# Patient Record
Sex: MALE | Race: WHITE | Employment: FULL TIME | URBAN - METROPOLITAN AREA
[De-identification: names, ages, dates, MRNs, and addresses within clinical notes are randomized per-mention and may not be internally consistent; named-entity substitution may affect disease eponyms.]

---

## 2022-10-21 ENCOUNTER — APPOINTMENT (OUTPATIENT)
Dept: GENERAL RADIOLOGY | Age: 67
DRG: 552 | End: 2022-10-21
Payer: COMMERCIAL

## 2022-10-21 ENCOUNTER — APPOINTMENT (OUTPATIENT)
Dept: CT IMAGING | Age: 67
DRG: 552 | End: 2022-10-21
Payer: COMMERCIAL

## 2022-10-21 ENCOUNTER — HOSPITAL ENCOUNTER (INPATIENT)
Age: 67
LOS: 3 days | Discharge: HOME | DRG: 552 | End: 2022-10-25
Attending: EMERGENCY MEDICINE | Admitting: SURGERY
Payer: COMMERCIAL

## 2022-10-21 DIAGNOSIS — R55 SYNCOPE AND COLLAPSE: ICD-10-CM
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
## 2022-10-21 DIAGNOSIS — I48.91 NEW ONSET A-FIB (HCC): ICD-10-CM

## 2022-10-21 DIAGNOSIS — S12.491A OTHER CLOSED NONDISPLACED FRACTURE OF FIFTH CERVICAL VERTEBRA, INITIAL ENCOUNTER (HCC): ICD-10-CM

## 2022-10-21 LAB
ABSOLUTE EOS #: <0.03 K/UL (ref 0–0.44)
ABSOLUTE IMMATURE GRANULOCYTE: <0.03 K/UL (ref 0–0.3)
ABSOLUTE LYMPH #: 2.45 K/UL (ref 1.1–3.7)
ABSOLUTE MONO #: 0.31 K/UL (ref 0.1–1.2)
ACETAMINOPHEN LEVEL: <5 UG/ML (ref 10–30)
ALBUMIN SERPL-MCNC: 2.9 G/DL (ref 3.5–5.2)
ALBUMIN/GLOBULIN RATIO: 1.4 (ref 1–2.5)
ALP BLD-CCNC: 80 U/L (ref 40–129)
ALT SERPL-CCNC: 26 U/L (ref 5–41)
ANION GAP SERPL CALCULATED.3IONS-SCNC: 12 MMOL/L (ref 9–17)
AST SERPL-CCNC: 35 U/L
BASOPHILS # BLD: 0 % (ref 0–2)
BASOPHILS ABSOLUTE: <0.03 K/UL (ref 0–0.2)
BILIRUB SERPL-MCNC: 0.3 MG/DL (ref 0.3–1.2)
BUN BLDV-MCNC: 10 MG/DL (ref 8–23)
CALCIUM SERPL-MCNC: 7.8 MG/DL (ref 8.6–10.4)
CHLORIDE BLD-SCNC: 104 MMOL/L (ref 98–107)
CO2: 22 MMOL/L (ref 20–31)
CREAT SERPL-MCNC: 0.81 MG/DL (ref 0.7–1.2)
D-DIMER QUANTITATIVE: 1.86 MG/L FEU
EOSINOPHILS RELATIVE PERCENT: 0 % (ref 1–4)
ETHANOL PERCENT: <0.01 %
ETHANOL: <10 MG/DL
GFR SERPL CREATININE-BSD FRML MDRD: >60 ML/MIN/1.73M2
GLUCOSE BLD-MCNC: 120 MG/DL (ref 70–99)
GLUCOSE BLD-MCNC: 140 MG/DL (ref 75–110)
GLUCOSE BLD-MCNC: 43 MG/DL (ref 75–110)
GLUCOSE BLD-MCNC: 61 MG/DL (ref 70–99)
HCT VFR BLD CALC: 33.8 % (ref 40.7–50.3)
HEMOGLOBIN: 11.5 G/DL (ref 13–17)
IMMATURE GRANULOCYTES: 0 %
INR BLD: 1.1
LYMPHOCYTES # BLD: 54 % (ref 24–43)
MCH RBC QN AUTO: 30.2 PG (ref 25.2–33.5)
MCHC RBC AUTO-ENTMCNC: 34 G/DL (ref 28.4–34.8)
MCV RBC AUTO: 88.7 FL (ref 82.6–102.9)
MONOCYTES # BLD: 7 % (ref 3–12)
NRBC AUTOMATED: 0 PER 100 WBC
PDW BLD-RTO: 14.7 % (ref 11.8–14.4)
PLATELET # BLD: 157 K/UL (ref 138–453)
PMV BLD AUTO: 9.4 FL (ref 8.1–13.5)
POTASSIUM SERPL-SCNC: 3.1 MMOL/L (ref 3.7–5.3)
PRO-BNP: 1630 PG/ML
PROTHROMBIN TIME: 11.7 SEC (ref 9.1–12.3)
RBC # BLD: 3.81 M/UL (ref 4.21–5.77)
RBC # BLD: ABNORMAL 10*6/UL
SALICYLATE LEVEL: <1 MG/DL (ref 3–10)
SEG NEUTROPHILS: 39 % (ref 36–65)
SEGMENTED NEUTROPHILS ABSOLUTE COUNT: 1.81 K/UL (ref 1.5–8.1)
SODIUM BLD-SCNC: 138 MMOL/L (ref 135–144)
TOTAL PROTEIN: 5 G/DL (ref 6.4–8.3)
TOXIC TRICYCLIC SC,BLOOD: NEGATIVE
TROPONIN, HIGH SENSITIVITY: 13 NG/L (ref 0–22)
TROPONIN, HIGH SENSITIVITY: 14 NG/L (ref 0–22)
TSH SERPL DL<=0.05 MIU/L-ACNC: 4.73 UIU/ML (ref 0.3–5)
WBC # BLD: 4.6 K/UL (ref 3.5–11.3)

## 2022-10-21 PROCEDURE — 80053 COMPREHEN METABOLIC PANEL: CPT

## 2022-10-21 PROCEDURE — 82947 ASSAY GLUCOSE BLOOD QUANT: CPT

## 2022-10-21 PROCEDURE — 3209999900 CT LUMBAR SPINE TRAUMA RECONSTRUCTION

## 2022-10-21 PROCEDURE — 6360000004 HC RX CONTRAST MEDICATION: Performed by: STUDENT IN AN ORGANIZED HEALTH CARE EDUCATION/TRAINING PROGRAM

## 2022-10-21 PROCEDURE — 96361 HYDRATE IV INFUSION ADD-ON: CPT

## 2022-10-21 PROCEDURE — 99285 EMERGENCY DEPT VISIT HI MDM: CPT

## 2022-10-21 PROCEDURE — 85025 COMPLETE CBC W/AUTO DIFF WBC: CPT

## 2022-10-21 PROCEDURE — 70450 CT HEAD/BRAIN W/O DYE: CPT

## 2022-10-21 PROCEDURE — 80307 DRUG TEST PRSMV CHEM ANLYZR: CPT

## 2022-10-21 PROCEDURE — 93005 ELECTROCARDIOGRAM TRACING: CPT | Performed by: SURGERY

## 2022-10-21 PROCEDURE — 71260 CT THORAX DX C+: CPT

## 2022-10-21 PROCEDURE — 96360 HYDRATION IV INFUSION INIT: CPT

## 2022-10-21 PROCEDURE — 84484 ASSAY OF TROPONIN QUANT: CPT

## 2022-10-21 PROCEDURE — 84443 ASSAY THYROID STIM HORMONE: CPT

## 2022-10-21 PROCEDURE — 85379 FIBRIN DEGRADATION QUANT: CPT

## 2022-10-21 PROCEDURE — 83880 ASSAY OF NATRIURETIC PEPTIDE: CPT

## 2022-10-21 PROCEDURE — 2580000003 HC RX 258

## 2022-10-21 PROCEDURE — 71045 X-RAY EXAM CHEST 1 VIEW: CPT

## 2022-10-21 PROCEDURE — 80179 DRUG ASSAY SALICYLATE: CPT

## 2022-10-21 PROCEDURE — 85610 PROTHROMBIN TIME: CPT

## 2022-10-21 PROCEDURE — G0480 DRUG TEST DEF 1-7 CLASSES: HCPCS

## 2022-10-21 PROCEDURE — 72125 CT NECK SPINE W/O DYE: CPT

## 2022-10-21 PROCEDURE — 80143 DRUG ASSAY ACETAMINOPHEN: CPT

## 2022-10-21 PROCEDURE — 3209999900 CT THORACIC SPINE TRAUMA RECONSTRUCTION

## 2022-10-21 PROCEDURE — 74177 CT ABD & PELVIS W/CONTRAST: CPT

## 2022-10-21 RX ORDER — DEXTROSE MONOHYDRATE 100 MG/ML
INJECTION, SOLUTION INTRAVENOUS
Status: COMPLETED
Start: 2022-10-21 | End: 2022-10-21

## 2022-10-21 RX ORDER — DEXTROSE MONOHYDRATE 100 MG/ML
INJECTION, SOLUTION INTRAVENOUS CONTINUOUS
Status: DISCONTINUED | OUTPATIENT
Start: 2022-10-21 | End: 2022-10-22

## 2022-10-21 RX ADMIN — DEXTROSE MONOHYDRATE: 100 INJECTION, SOLUTION INTRAVENOUS at 20:55

## 2022-10-21 RX ADMIN — IOPAMIDOL 75 ML: 755 INJECTION, SOLUTION INTRAVENOUS at 22:24

## 2022-10-21 ASSESSMENT — ENCOUNTER SYMPTOMS
DIARRHEA: 0
VOMITING: 0
NAUSEA: 0
COLOR CHANGE: 0
CHEST TIGHTNESS: 0
BACK PAIN: 0
SHORTNESS OF BREATH: 0
CONSTIPATION: 0
ABDOMINAL PAIN: 0
TROUBLE SWALLOWING: 0
COUGH: 0

## 2022-10-21 ASSESSMENT — PAIN - FUNCTIONAL ASSESSMENT: PAIN_FUNCTIONAL_ASSESSMENT: NONE - DENIES PAIN

## 2022-10-21 NOTE — ED PROVIDER NOTES
[facility] ED  Emergency Department Encounter  EmergencyMedicine Resident     Pt Name:Gilmer aNsh  MRN: [id number]  [place] 1955  Date of evaluation: 10/21/22  PCP:  Quynh Boyd MD    [address] [place] [address]       Chief Complaint   Patient presents with    Loss of Consciousness     Nose lac bleeding controlled        HISTORY OF PRESENT ILLNESS  (Location/Symptom, Timing/Onset, Context/Setting, Quality, Duration, Modifying Factors, Severity.)      Trevor Dumont is a 78 y.o. male who presents with presents with syncopal event. Patient reportedly hit his head on an unknown object. Patient denies ever having symptoms like this before. States he is having a headache feels dizzy. Patient has a history of CABG and stage III pancreatic cancer on chemotherapy patient also with history of diabetes    PAST MEDICAL / SURGICAL / SOCIAL / FAMILY HISTORY      has no past medical history on file. has no past surgical history on file. Social History     Socioeconomic History    Marital status:      Spouse name: Not on file    Number of children: Not on file    Years of education: Not on file    Highest education level: Not on file   Occupational History    Not on file   Tobacco Use    Smoking status: Not on file    Smokeless tobacco: Not on file   Substance and Sexual Activity    Alcohol use: Not on file    Drug use: Not on file    Sexual activity: Not on file   Other Topics Concern    Not on file   Social History Narrative    Not on file     Social Determinants of Health     Financial Resource Strain: Not on file   Food Insecurity: Not on file   Transportation Needs: Not on file   Physical Activity: Not on file   Stress: Not on file   Social Connections: Not on file   Intimate Partner Violence: Not on file   Housing Stability: Not on file       No family history on file. Allergies:  Patient has no known allergies.     Home Medications:  Prior to Admission medications    Not on File       REVIEW OF SYSTEMS    (2-9 systems for level 4, 10 or more for level 5)      Review of Systems   Constitutional:  Negative for appetite change, fatigue and fever. HENT:  Negative for congestion and trouble swallowing. Respiratory:  Negative for cough, chest tightness and shortness of breath. Cardiovascular:  Negative for chest pain and leg swelling. Gastrointestinal:  Negative for abdominal pain, constipation, diarrhea, nausea and vomiting. Genitourinary:  Negative for dysuria. Musculoskeletal:  Negative for back pain. Skin:  Negative for color change and rash. Neurological:  Positive for dizziness, syncope and light-headedness. Negative for weakness and headaches. PHYSICAL EXAM   (up to 7 for level 4, 8 or more for level 5)      INITIAL VITALS:   BP (!) 164/81   Pulse 56   Temp 97.8 °F (36.6 °C) (Oral)   Resp 17   Ht 6' (1.829 m)   Wt 200 lb (90.7 kg)   SpO2 98%   BMI 27.12 kg/m²     Physical Exam  Constitutional:       General: He is not in acute distress. Appearance: He is ill-appearing. HENT:      Head: Normocephalic and atraumatic. Nose:      Comments: 2cm laceration  Eyes:      General: No scleral icterus. Extraocular Movements: Extraocular movements intact. Pupils: Pupils are equal, round, and reactive to light. Cardiovascular:      Rate and Rhythm: Bradycardia present. Rhythm irregular. Heart sounds: No murmur heard. No friction rub. No gallop. Pulmonary:      Breath sounds: No wheezing, rhonchi or rales. Abdominal:      General: Abdomen is flat. There is no distension. Palpations: Abdomen is soft. Tenderness: There is no abdominal tenderness. There is no guarding or rebound. Musculoskeletal:         General: No swelling, tenderness or signs of injury. Cervical back: Normal range of motion. No tenderness. Right lower leg: No edema. Left lower leg: No edema.    Skin:     Capillary Refill: Capillary refill takes less than 2 seconds. Findings: No rash. Neurological:      General: No focal deficit present. Mental Status: He is oriented to person, place, and time.        DIFFERENTIAL  DIAGNOSIS     PLAN (LABS / IMAGING / EKG):  Orders Placed This Encounter   Procedures    LACERATION REPAIR    CT HEAD WO CONTRAST    CT CERVICAL SPINE WO CONTRAST    XR CHEST PORTABLE    CT CHEST ABDOMEN PELVIS W CONTRAST Additional Contrast? None    CT THORACIC SPINE TRAUMA RECONSTRUCTION    CT LUMBAR SPINE TRAUMA RECONSTRUCTION    CTA NECK W CONTRAST    CBC with Auto Differential    CMP    D-Dimer, Quantitative    Protime-INR    Troponin    Brain Natriuretic Peptide    TSH with Reflex    TOX SCR, BLD, ED    Digoxin Level    Glucose, Random    Inpatient consult to Neurosurgery    Inpatient consult to Trauma Surgery    POC Glucose Fingerstick    POC Glucose Fingerstick    POC Glucose Fingerstick    POC Glucose Fingerstick    EKG 12 Lead    ADMIT TO INPATIENT       MEDICATIONS ORDERED:  Orders Placed This Encounter   Medications    iopamidol (ISOVUE-370) 76 % injection 75 mL    dextrose 10 % infusion     Irma Parks: cabinet override    dextrose 10 % infusion    Tetanus-Diphth-Acell Pertussis (BOOSTRIX) injection 0.5 mL    DISCONTD: potassium chloride 10 mEq/100 mL IVPB (Peripheral Line)    potassium bicarb-citric acid (EFFER-K) effervescent tablet 40 mEq    iopamidol (ISOVUE-370) 76 % injection 75 mL    lidocaine 1 % injection 20 mL       DIAGNOSTIC RESULTS / EMERGENCY DEPARTMENT COURSE / MDM   LAB RESULTS:  Results for orders placed or performed during the hospital encounter of 10/21/22   CBC with Auto Differential   Result Value Ref Range    WBC 4.6 3.5 - 11.3 k/uL    RBC 3.81 (L) 4.21 - 5.77 m/uL    Hemoglobin 11.5 (L) 13.0 - 17.0 g/dL    Hematocrit 33.8 (L) 40.7 - 50.3 %    MCV 88.7 82.6 - 102.9 fL    MCH 30.2 25.2 - 33.5 pg    MCHC 34.0 28.4 - 34.8 g/dL    RDW 14.7 (H) 11.8 - 14.4 %    Platelets 858 513 - 196 k/uL    MPV 9.4 8.1 - 13.5 fL    NRBC Automated 0.0 0.0 per 100 WBC    Seg Neutrophils 39 36 - 65 %    Lymphocytes 54 (H) 24 - 43 %    Monocytes 7 3 - 12 %    Eosinophils % 0 (L) 1 - 4 %    Basophils 0 0 - 2 %    Immature Granulocytes 0 0 %    Segs Absolute 1.81 1.50 - 8.10 k/uL    Absolute Lymph # 2.45 1.10 - 3.70 k/uL    Absolute Mono # 0.31 0.10 - 1.20 k/uL    Absolute Eos # <0.03 0.00 - 0.44 k/uL    Basophils Absolute <0.03 0.00 - 0.20 k/uL    Absolute Immature Granulocyte <0.03 0.00 - 0.30 k/uL    RBC Morphology ANISOCYTOSIS PRESENT    CMP   Result Value Ref Range    Glucose 61 (L) 70 - 99 mg/dL    BUN 10 8 - 23 mg/dL    Creatinine 0.81 0.70 - 1.20 mg/dL    Est, Glom Filt Rate >60 >60 mL/min/1.73m2    Calcium 7.8 (L) 8.6 - 10.4 mg/dL    Sodium 138 135 - 144 mmol/L    Potassium 3.1 (L) 3.7 - 5.3 mmol/L    Chloride 104 98 - 107 mmol/L    CO2 22 20 - 31 mmol/L    Anion Gap 12 9 - 17 mmol/L    Alkaline Phosphatase 80 40 - 129 U/L    ALT 26 5 - 41 U/L    AST 35 <40 U/L    Total Bilirubin 0.3 0.3 - 1.2 mg/dL    Total Protein 5.0 (L) 6.4 - 8.3 g/dL    Albumin 2.9 (L) 3.5 - 5.2 g/dL    Albumin/Globulin Ratio 1.4 1.0 - 2.5   D-Dimer, Quantitative   Result Value Ref Range    D-Dimer, Quant 1.86 mg/L FEU   Protime-INR   Result Value Ref Range    Protime 11.7 9.1 - 12.3 sec    INR 1.1    Troponin   Result Value Ref Range    Troponin, High Sensitivity 13 0 - 22 ng/L   Troponin   Result Value Ref Range    Troponin, High Sensitivity 14 0 - 22 ng/L   Brain Natriuretic Peptide   Result Value Ref Range    Pro-BNP 1,630 (H) <300 pg/mL   TSH with Reflex   Result Value Ref Range    TSH 4.73 0.30 - 5.00 uIU/mL   TOX SCR, BLD, ED   Result Value Ref Range    Acetaminophen Level <5 (L) 10 - 30 ug/mL    Ethanol <10 <10 mg/dL    Ethanol percent <6.329 <6.029 %    Salicylate Lvl <1 (L) 3 - 10 mg/dL    Toxic Tricyclic Sc,Blood NEGATIVE NEGATIVE   Glucose, Random   Result Value Ref Range    Glucose 120 (H) 70 - 99 mg/dL   POC Glucose Fingerstick Result Value Ref Range    POC Glucose 43 (L) 75 - 110 mg/dL   POC Glucose Fingerstick   Result Value Ref Range    POC Glucose 140 (H) 75 - 110 mg/dL   POC Glucose Fingerstick   Result Value Ref Range    POC Glucose 110 75 - 110 mg/dL   POC Glucose Fingerstick   Result Value Ref Range    POC Glucose 146 (H) 75 - 110 mg/dL       RADIOLOGY:  CT HEAD WO CONTRAST    Result Date: 10/21/2022  Head CT: No acute intracranial abnormality detected. Mildly impacted and angulated nasal bone fractures bilaterally. Cervical spine CT: Potentially unstable fracture involving the posterior elements of C5. Findings were discussed with Heladio KABA at 11:12 pm on 10/21/2022. CT CERVICAL SPINE WO CONTRAST    Result Date: 10/21/2022  Head CT: No acute intracranial abnormality detected. Mildly impacted and angulated nasal bone fractures bilaterally. Cervical spine CT: Potentially unstable fracture involving the posterior elements of C5. Findings were discussed with Heladio KABA at 11:12 pm on 10/21/2022. XR CHEST PORTABLE    Result Date: 10/21/2022  Cardial pericardial silhouette enlargement. Differential considerations include cardiomyopathy and pericardial effusion. No acute infiltrate identified. CT CHEST ABDOMEN PELVIS W CONTRAST Additional Contrast? None    Result Date: 10/21/2022  1. No thoracic or abdominal aortic aneurysm or dissection. 2. Atherosclerosis with coronary artery involvement and postoperative changes from prior CABG. 3. Postoperative changes seen related to reported history of pancreatic neoplasm, with a 19 mm left adrenal nodule which could represent adrenal metastasis. Correlate with any previous outside CT imaging as no comparisons are available within PACS for my review. 4. There is a small fluid collection seen adjacent to the pancreatic resection site and hepaticojejunostomy, which could represent a small postoperative seroma or lymphocele.   No solid-appearing metastasis noted elsewhere. 5. No acute process seen within the abdomen or pelvis. 6. Trace amount of ascites seen in the right lower quadrant of uncertain significance. CT LUMBAR SPINE TRAUMA RECONSTRUCTION    Result Date: 10/22/2022  1. No acute lumbar spine or thoracic spine fracture. 2. Multilevel mild degenerative changes are seen. CT THORACIC SPINE TRAUMA RECONSTRUCTION    Result Date: 10/22/2022  1. No acute lumbar spine or thoracic spine fracture. 2. Multilevel mild degenerative changes are seen. EKG Interpretation    Interpreted by myself    Atrial fibrillation with slow ventricular response no ST elevation or depression    Presumed new onset atrial fibrillation    All EKG's are interpreted by the Emergency Department Physician who either signs or Co-signs this chart in the absence of a cardiologist.    EMERGENCY DEPARTMENT COURSE:  ED Course as of 10/22/22 0316   Fri Oct 21, 2022   1937 Patient evaluated bedside. Syncope fell and hit his head. ANO x4. Nasal bone lac. Will get work-up head CT cervical x-ray and EKG [ZE]   2053 hypoglycemic patient took Lantus today. No sulfonylureas. We will start D10 [ZE]   2327 Angulated nasal bone fractures. Unstable C5 fracture.   Placed in collar trauma consulted [ZE]   Sat Oct 22, 2022   0146 Patient admitted to trauma service [ZE]      ED Course User Index  [ZE] Sukhjinder Donovan DO       PROCEDURES:  Lac Repair    Date/Time: 10/22/2022 3:15 AM  Performed by: Sukhjinder Donovan DO  Authorized by: Jesus Tomlinson MD     Consent:     Consent obtained:  Verbal    Consent given by:  Patient    Risks discussed:  Pain and need for additional repair    Alternatives discussed:  Delayed treatment  Anesthesia:     Anesthesia method:  Local infiltration    Local anesthetic:  Lidocaine 1% w/o epi  Laceration details:     Location:  Face    Face location:  Nose    Length (cm):  2    Depth (mm):  5  Pre-procedure details:     Preparation:  Patient was prepped and draped in usual sterile fashion  Exploration:     Wound extent: areolar tissue violated      Contaminated: no    Treatment:     Area cleansed with:  Saline    Amount of cleaning:  Standard    Irrigation solution:  Sterile saline    Irrigation volume:  50cc    Irrigation method:  Syringe    Visualized foreign bodies/material removed: no    Skin repair:     Repair method:  Sutures    Suture size:  5-0    Suture material:  Nylon    Suture technique:  Simple interrupted    Number of sutures:  2  Approximation:     Approximation:  Close  Repair type:     Repair type:  Simple  Post-procedure details:     Dressing:  Antibiotic ointment     CONSULTS:  IP CONSULT TO NEUROSURGERY  IP CONSULT TO TRAUMA SURGERY    CRITICAL CARE:  none    MDM  Presents as a syncopal fall unclear etiology patient does have new onset A. fib with slow ventricular response this could be the reason. Patient was noted to have nasal bone fractures and unstable C5 fracture therefore trauma was consulted and admitted patient. CT chest abdomen pelvis unremarkable labs otherwise unremarkable. Patient did have episode of hypoglycemia requiring D10    FINAL IMPRESSION      1. Fall, initial encounter    2. Syncope and collapse    3. Other closed nondisplaced fracture of fifth cervical vertebra, initial encounter ([facility] [address]    4. New onset a-fib [facility])          DISPOSITION / PLAN     DISPOSITION Admitted 10/22/2022 03:16:19 AM      PATIENT REFERRED TO:  No follow-up provider specified.     DISCHARGE MEDICATIONS:  New Prescriptions    No medications on file       Reginald Moore DO  Emergency Medicine Resident    (Please note that portions of thisnote were completed with a voice recognition program.  Efforts were made to edit the dictations but occasionally words are mis-transcribed.)        Mehul Son, DO  Resident  10/22/22 [id number], DO  Resident  10/22/22 [id number]

## 2022-10-21 NOTE — ED PROVIDER NOTES
[facility] [address] ED     Emergency Department     Faculty Attestation        I performed a history and physical examination of the patient and discussed management with the resident. I reviewed the residents note and agree with the documented findings and plan of care. Any areas of disagreement are noted on the chart. I was personally present for the key portions of any procedures. I have documented in the chart those procedures where I was not present during the key portions. I have reviewed the emergency nurses triage note. I agree with the chief complaint, past medical history, past surgical history, allergies, medications, social and family history as documented unless otherwise noted below. For Physician Assistant/ Nurse Practitioner cases/documentation I have personally evaluated this patient and have completed at least one if not all key elements of the E/M (history, physical exam, and MDM). Additional findings are as noted. Vital Signs: BP: 135/78  Heart Rate: 57  Resp: 22  Temp: (!) 94.8 °F (34.9 °C) (RN unable to obtain temp w 2 different thermometers dr Jhonny Beyer aware) SpO2: 95 %  PCP:  No primary care provider on file. Pertinent Comments:     Patient is a 71-year-old male who has limited knowledge of his past medical history but wife was able to give better history after her arrival.   He apparently has pancreatic cancer with possible metastases. Also history of CABG. Was at the [facility] when suffered what appeared to be a syncopal episode per wife with no obvious seizure activity or tongue biting. Did hit his forehead/nose with laceration. Patient is now back to almost his baseline with only very mild confusion. No C-spine pain but will obtain imaging. Moving all extremities with 5/5 strength and sensation light touch intact.    Pupils are readily reactive to light bilaterally and equal.    On exam and on the monitor appears to have right bundle branch block with atrial fibrillation with slow ventricular response. No hypotension at this time. Assessment/plan: Patient with severe medical problems as well as syncopal episode of unknown etiology with apparently no history of atrial fibrillation. Placing on [place] in case needed with occasional heart rate in the 30s but blood pressure normal.    Awaiting CT head as well as C-spine and CT chest/abdomen/pelvis. Obviously cardiac work-up and toxicologic work-up as well. Admission after    EKG Interpretation    Interpreted by emergency department physician    Rhythm: Irregularly irregular  Rate: Atrial fibrillation with slow ventricular response at 52 bpm  Axis: normal  Conduction: Right bundle branch block with atrial fibrillation  ST Segments: no acute change  T Waves: no acute change  Q Waves: no acute change    Clinical Impression: Atrial Fibrillation with slow ventricular response and right bundle branch block      CRITICAL CARE: There was a high probability of clinically significant/life threatening deterioration in this patient's condition which required my urgent intervention. Total critical care time was 20 minutes. This excludes any time for separately reportable procedures. (Please note that portions of this note were completed with a voice recognition program. Efforts were made to edit the dictations but occasionally words are mis-transcribed.  Whenever words are used in this note in any gender, they shall be construed as though they were used in the gender appropriate to the circumstances; and whenever words are used in this note in the singular or plural form, they shall be construed as though they were used in the form appropriate to the circumstances.)    MD Yomaira Mckeon Mt  Attending Emergency Medicine Physician           Fran Rivas MD  10/21/22 1 Jose Haro MD  10/21/22 [id number]

## 2022-10-22 ENCOUNTER — APPOINTMENT (OUTPATIENT)
Dept: CT IMAGING | Age: 67
DRG: 552 | End: 2022-10-22
Payer: COMMERCIAL

## 2022-10-22 ENCOUNTER — APPOINTMENT (OUTPATIENT)
Dept: MRI IMAGING | Age: 67
DRG: 552 | End: 2022-10-22
Payer: COMMERCIAL

## 2022-10-22 PROBLEM — S12.9XXA: Status: ACTIVE | Noted: 2022-10-22
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
## 2022-10-22 LAB
ABSOLUTE EOS #: <0.03 K/UL (ref 0–0.44)
ABSOLUTE IMMATURE GRANULOCYTE: <0.03 K/UL (ref 0–0.3)
ABSOLUTE LYMPH #: 1.2 K/UL (ref 1.1–3.7)
ABSOLUTE MONO #: 0.26 K/UL (ref 0.1–1.2)
ANION GAP SERPL CALCULATED.3IONS-SCNC: 9 MMOL/L (ref 9–17)
BASOPHILS # BLD: 0 % (ref 0–2)
BASOPHILS ABSOLUTE: <0.03 K/UL (ref 0–0.2)
BUN BLDV-MCNC: 8 MG/DL (ref 8–23)
CALCIUM SERPL-MCNC: 7.7 MG/DL (ref 8.6–10.4)
CHLORIDE BLD-SCNC: 102 MMOL/L (ref 98–107)
CO2: 23 MMOL/L (ref 20–31)
CREAT SERPL-MCNC: 0.65 MG/DL (ref 0.7–1.2)
DIGOXIN LEVEL: <0.3 NG/ML (ref 0.5–2)
EOSINOPHILS RELATIVE PERCENT: 0 % (ref 1–4)
GFR SERPL CREATININE-BSD FRML MDRD: >60 ML/MIN/1.73M2
GLUCOSE BLD-MCNC: 110 MG/DL (ref 75–110)
GLUCOSE BLD-MCNC: 127 MG/DL (ref 75–110)
GLUCOSE BLD-MCNC: 139 MG/DL (ref 75–110)
GLUCOSE BLD-MCNC: 141 MG/DL (ref 70–99)
GLUCOSE BLD-MCNC: 146 MG/DL (ref 75–110)
GLUCOSE BLD-MCNC: 189 MG/DL (ref 75–110)
GLUCOSE BLD-MCNC: 209 MG/DL (ref 75–110)
GLUCOSE BLD-MCNC: 271 MG/DL (ref 75–110)
HCT VFR BLD CALC: 32.6 % (ref 40.7–50.3)
HEMOGLOBIN: 11.2 G/DL (ref 13–17)
IMMATURE GRANULOCYTES: 0 %
LYMPHOCYTES # BLD: 29 % (ref 24–43)
MAGNESIUM: 1.7 MG/DL (ref 1.6–2.6)
MCH RBC QN AUTO: 30.4 PG (ref 25.2–33.5)
MCHC RBC AUTO-ENTMCNC: 34.4 G/DL (ref 28.4–34.8)
MCV RBC AUTO: 88.6 FL (ref 82.6–102.9)
MONOCYTES # BLD: 6 % (ref 3–12)
MRSA, DNA, NASAL: NEGATIVE
NRBC AUTOMATED: 0 PER 100 WBC
PDW BLD-RTO: 14.6 % (ref 11.8–14.4)
PHOSPHORUS: 3.5 MG/DL (ref 2.5–4.5)
PLATELET # BLD: 111 K/UL (ref 138–453)
PMV BLD AUTO: 9.1 FL (ref 8.1–13.5)
POTASSIUM SERPL-SCNC: 3.8 MMOL/L (ref 3.7–5.3)
RBC # BLD: 3.68 M/UL (ref 4.21–5.77)
RBC # BLD: ABNORMAL 10*6/UL
SEG NEUTROPHILS: 65 % (ref 36–65)
SEGMENTED NEUTROPHILS ABSOLUTE COUNT: 2.68 K/UL (ref 1.5–8.1)
SODIUM BLD-SCNC: 134 MMOL/L (ref 135–144)
SPECIMEN DESCRIPTION: NORMAL
WBC # BLD: 4.2 K/UL (ref 3.5–11.3)

## 2022-10-22 PROCEDURE — 6370000000 HC RX 637 (ALT 250 FOR IP): Performed by: STUDENT IN AN ORGANIZED HEALTH CARE EDUCATION/TRAINING PROGRAM

## 2022-10-22 PROCEDURE — 70498 CT ANGIOGRAPHY NECK: CPT

## 2022-10-22 PROCEDURE — 82947 ASSAY GLUCOSE BLOOD QUANT: CPT

## 2022-10-22 PROCEDURE — 2000000000 HC ICU R&B

## 2022-10-22 PROCEDURE — 99252 IP/OBS CONSLTJ NEW/EST SF 35: CPT | Performed by: NEUROLOGICAL SURGERY

## 2022-10-22 PROCEDURE — 2580000003 HC RX 258: Performed by: STUDENT IN AN ORGANIZED HEALTH CARE EDUCATION/TRAINING PROGRAM

## 2022-10-22 PROCEDURE — 6370000000 HC RX 637 (ALT 250 FOR IP): Performed by: INTERNAL MEDICINE

## 2022-10-22 PROCEDURE — 92523 SPEECH SOUND LANG COMPREHEN: CPT

## 2022-10-22 PROCEDURE — 80162 ASSAY OF DIGOXIN TOTAL: CPT

## 2022-10-22 PROCEDURE — 84100 ASSAY OF PHOSPHORUS: CPT

## 2022-10-22 PROCEDURE — 90471 IMMUNIZATION ADMIN: CPT | Performed by: STUDENT IN AN ORGANIZED HEALTH CARE EDUCATION/TRAINING PROGRAM

## 2022-10-22 PROCEDURE — 6360000002 HC RX W HCPCS: Performed by: STUDENT IN AN ORGANIZED HEALTH CARE EDUCATION/TRAINING PROGRAM

## 2022-10-22 PROCEDURE — 80048 BASIC METABOLIC PNL TOTAL CA: CPT

## 2022-10-22 PROCEDURE — 87641 MR-STAPH DNA AMP PROBE: CPT

## 2022-10-22 PROCEDURE — 85025 COMPLETE CBC W/AUTO DIFF WBC: CPT

## 2022-10-22 PROCEDURE — 83735 ASSAY OF MAGNESIUM: CPT

## 2022-10-22 PROCEDURE — 94761 N-INVAS EAR/PLS OXIMETRY MLT: CPT

## 2022-10-22 PROCEDURE — 36415 COLL VENOUS BLD VENIPUNCTURE: CPT

## 2022-10-22 PROCEDURE — 6360000004 HC RX CONTRAST MEDICATION: Performed by: STUDENT IN AN ORGANIZED HEALTH CARE EDUCATION/TRAINING PROGRAM

## 2022-10-22 PROCEDURE — 72141 MRI NECK SPINE W/O DYE: CPT

## 2022-10-22 PROCEDURE — 93005 ELECTROCARDIOGRAM TRACING: CPT | Performed by: STUDENT IN AN ORGANIZED HEALTH CARE EDUCATION/TRAINING PROGRAM

## 2022-10-22 PROCEDURE — 90715 TDAP VACCINE 7 YRS/> IM: CPT | Performed by: STUDENT IN AN ORGANIZED HEALTH CARE EDUCATION/TRAINING PROGRAM

## 2022-10-22 RX ORDER — SODIUM CHLORIDE 9 MG/ML
INJECTION, SOLUTION INTRAVENOUS PRN
Status: DISCONTINUED | OUTPATIENT
Start: 2022-10-22 | End: 2022-10-25

## 2022-10-22 RX ORDER — FENTANYL CITRATE 50 UG/ML
50 INJECTION, SOLUTION INTRAMUSCULAR; INTRAVENOUS ONCE
Status: COMPLETED | OUTPATIENT
Start: 2022-10-22 | End: 2022-10-22

## 2022-10-22 RX ORDER — ACETAMINOPHEN 500 MG
1000 TABLET ORAL EVERY 8 HOURS SCHEDULED
Status: DISCONTINUED | OUTPATIENT
Start: 2022-10-22 | End: 2022-10-23

## 2022-10-22 RX ORDER — SODIUM CHLORIDE 0.9 % (FLUSH) 0.9 %
5-40 SYRINGE (ML) INJECTION PRN
Status: DISCONTINUED | OUTPATIENT
Start: 2022-10-22 | End: 2022-10-25 | Stop reason: HOSPADM

## 2022-10-22 RX ORDER — INSULIN GLARGINE 100 [IU]/ML
18 INJECTION, SOLUTION SUBCUTANEOUS EVERY MORNING
COMMUNITY

## 2022-10-22 RX ORDER — DEXAMETHASONE 4 MG/1
4 TABLET ORAL DAILY
Status: DISCONTINUED | OUTPATIENT
Start: 2022-10-22 | End: 2022-10-22

## 2022-10-22 RX ORDER — SODIUM CHLORIDE 0.9 % (FLUSH) 0.9 %
5-40 SYRINGE (ML) INJECTION EVERY 12 HOURS SCHEDULED
Status: DISCONTINUED | OUTPATIENT
Start: 2022-10-22 | End: 2022-10-25 | Stop reason: HOSPADM

## 2022-10-22 RX ORDER — INSULIN GLARGINE 100 [IU]/ML
18 INJECTION, SOLUTION SUBCUTANEOUS EVERY MORNING
Status: DISCONTINUED | OUTPATIENT
Start: 2022-10-22 | End: 2022-10-25

## 2022-10-22 RX ORDER — POLYETHYLENE GLYCOL 3350 17 G/17G
17 POWDER, FOR SOLUTION ORAL DAILY
Status: DISCONTINUED | OUTPATIENT
Start: 2022-10-22 | End: 2022-10-25 | Stop reason: HOSPADM

## 2022-10-22 RX ORDER — DEXTROSE MONOHYDRATE 100 MG/ML
INJECTION, SOLUTION INTRAVENOUS CONTINUOUS PRN
Status: DISCONTINUED | OUTPATIENT
Start: 2022-10-22 | End: 2022-10-25

## 2022-10-22 RX ORDER — AMLODIPINE BESYLATE 5 MG/1
5 TABLET ORAL DAILY
Status: DISCONTINUED | OUTPATIENT
Start: 2022-10-22 | End: 2022-10-23

## 2022-10-22 RX ORDER — OXYCODONE HYDROCHLORIDE 5 MG/1
5 TABLET ORAL EVERY 6 HOURS PRN
Status: DISCONTINUED | OUTPATIENT
Start: 2022-10-22 | End: 2022-10-25

## 2022-10-22 RX ORDER — AMLODIPINE BESYLATE 2.5 MG/1
2.5 TABLET ORAL DAILY
COMMUNITY

## 2022-10-22 RX ORDER — SODIUM CHLORIDE, SODIUM LACTATE, POTASSIUM CHLORIDE, CALCIUM CHLORIDE 600; 310; 30; 20 MG/100ML; MG/100ML; MG/100ML; MG/100ML
INJECTION, SOLUTION INTRAVENOUS CONTINUOUS
Status: DISCONTINUED | OUTPATIENT
Start: 2022-10-22 | End: 2022-10-23

## 2022-10-22 RX ORDER — POTASSIUM CHLORIDE 7.45 MG/ML
10 INJECTION INTRAVENOUS
Status: DISCONTINUED | OUTPATIENT
Start: 2022-10-22 | End: 2022-10-22

## 2022-10-22 RX ORDER — ATORVASTATIN CALCIUM 80 MG/1
80 TABLET, FILM COATED ORAL DAILY
COMMUNITY

## 2022-10-22 RX ORDER — CANDESARTAN 32 MG/1
32 TABLET ORAL DAILY
COMMUNITY

## 2022-10-22 RX ORDER — ONDANSETRON 2 MG/ML
4 INJECTION INTRAMUSCULAR; INTRAVENOUS EVERY 6 HOURS PRN
Status: DISCONTINUED | OUTPATIENT
Start: 2022-10-22 | End: 2022-10-25

## 2022-10-22 RX ORDER — HYDRALAZINE HYDROCHLORIDE 20 MG/ML
10 INJECTION INTRAMUSCULAR; INTRAVENOUS EVERY 6 HOURS PRN
Status: DISCONTINUED | OUTPATIENT
Start: 2022-10-22 | End: 2022-10-25

## 2022-10-22 RX ORDER — CARVEDILOL 25 MG/1
25 TABLET ORAL 2 TIMES DAILY
COMMUNITY

## 2022-10-22 RX ORDER — CARVEDILOL 25 MG/1
25 TABLET ORAL 2 TIMES DAILY
Status: DISCONTINUED | OUTPATIENT
Start: 2022-10-22 | End: 2022-10-25 | Stop reason: HOSPADM

## 2022-10-22 RX ORDER — LIDOCAINE HYDROCHLORIDE 10 MG/ML
20 INJECTION, SOLUTION INFILTRATION; PERINEURAL ONCE
Status: DISCONTINUED | OUTPATIENT
Start: 2022-10-22 | End: 2022-10-25 | Stop reason: HOSPADM

## 2022-10-22 RX ORDER — ATORVASTATIN CALCIUM 80 MG/1
80 TABLET, FILM COATED ORAL DAILY
Status: DISCONTINUED | OUTPATIENT
Start: 2022-10-22 | End: 2022-10-25 | Stop reason: HOSPADM

## 2022-10-22 RX ORDER — GINSENG 100 MG
CAPSULE ORAL PRN
Status: DISCONTINUED | OUTPATIENT
Start: 2022-10-22 | End: 2022-10-25

## 2022-10-22 RX ORDER — DEXAMETHASONE 2 MG/1
4 TABLET ORAL SEE ADMIN INSTRUCTIONS
COMMUNITY

## 2022-10-22 RX ORDER — DEXAMETHASONE 4 MG/1
4 TABLET ORAL DAILY
Status: COMPLETED | OUTPATIENT
Start: 2022-10-22 | End: 2022-10-23

## 2022-10-22 RX ORDER — AMLODIPINE BESYLATE 2.5 MG/1
2.5 TABLET ORAL DAILY
Status: DISCONTINUED | OUTPATIENT
Start: 2022-10-22 | End: 2022-10-22

## 2022-10-22 RX ADMIN — TETANUS TOXOID, REDUCED DIPHTHERIA TOXOID AND ACELLULAR PERTUSSIS VACCINE, ADSORBED 0.5 ML: 5; 2.5; 8; 8; 2.5 SUSPENSION INTRAMUSCULAR at 00:13

## 2022-10-22 RX ADMIN — ATORVASTATIN CALCIUM 80 MG: 80 TABLET, FILM COATED ORAL at 15:15

## 2022-10-22 RX ADMIN — INSULIN GLARGINE 18 UNITS: 100 INJECTION, SOLUTION SUBCUTANEOUS at 12:57

## 2022-10-22 RX ADMIN — SODIUM CHLORIDE, POTASSIUM CHLORIDE, SODIUM LACTATE AND CALCIUM CHLORIDE: 600; 310; 30; 20 INJECTION, SOLUTION INTRAVENOUS at 16:46

## 2022-10-22 RX ADMIN — FENTANYL CITRATE 50 MCG: 50 INJECTION, SOLUTION INTRAMUSCULAR; INTRAVENOUS at 03:32

## 2022-10-22 RX ADMIN — IOPAMIDOL 75 ML: 755 INJECTION, SOLUTION INTRAVENOUS at 02:27

## 2022-10-22 RX ADMIN — DEXAMETHASONE 4 MG: 4 TABLET ORAL at 15:15

## 2022-10-22 RX ADMIN — SODIUM CHLORIDE, PRESERVATIVE FREE 10 ML: 5 INJECTION INTRAVENOUS at 21:09

## 2022-10-22 RX ADMIN — AMLODIPINE BESYLATE 5 MG: 5 TABLET ORAL at 15:15

## 2022-10-22 RX ADMIN — SODIUM CHLORIDE, PRESERVATIVE FREE 10 ML: 5 INJECTION INTRAVENOUS at 07:49

## 2022-10-22 RX ADMIN — ACETAMINOPHEN 1000 MG: 500 TABLET ORAL at 21:08

## 2022-10-22 RX ADMIN — ACETAMINOPHEN 1000 MG: 500 TABLET ORAL at 12:48

## 2022-10-22 RX ADMIN — SODIUM CHLORIDE, POTASSIUM CHLORIDE, SODIUM LACTATE AND CALCIUM CHLORIDE: 600; 310; 30; 20 INJECTION, SOLUTION INTRAVENOUS at 04:42

## 2022-10-22 RX ADMIN — BACITRACIN: 500 OINTMENT TOPICAL at 16:21

## 2022-10-22 RX ADMIN — POTASSIUM BICARBONATE 40 MEQ: 782 TABLET, EFFERVESCENT ORAL at 03:33

## 2022-10-22 RX ADMIN — ACETAMINOPHEN 1000 MG: 500 TABLET ORAL at 06:45

## 2022-10-22 ASSESSMENT — PAIN DESCRIPTION - LOCATION
LOCATION: NECK

## 2022-10-22 ASSESSMENT — PAIN SCALES - GENERAL
PAINLEVEL_OUTOF10: 0
PAINLEVEL_OUTOF10: 0
PAINLEVEL_OUTOF10: 2

## 2022-10-22 ASSESSMENT — LIFESTYLE VARIABLES
HOW OFTEN DO YOU HAVE A DRINK CONTAINING ALCOHOL: MONTHLY OR LESS
HOW MANY STANDARD DRINKS CONTAINING ALCOHOL DO YOU HAVE ON A TYPICAL DAY: 1 OR 2

## 2022-10-22 NOTE — CONSULTS
[facility]    Consult / H&P               Today's Date: 10/22/2022  Patient Name: Fauzia Jones  Date of admission: 10/21/2022  7:31 PM  Patient's age: 78 y.o., 1955  Admission Dx: Syncope and collapse [R55]  New onset a-fib [facility]) [I48.91]  Fall, initial encounter [W19. XXXA]  Closed fracture of spinous process of cervical vertebra, initial encounter ([facility] [address] [S12. 9XXA]  Other closed nondisplaced fracture of fifth cervical vertebra, initial encounter ([facility] [address] [S88.545F]    Reason for Consult:  Cardiac evaluation    Requesting Physician: Sarah Groves MD    REASON FOR CONSULT:  Bradycardia    History Obtained From:  patient, electronic medical record    HISTORY OF PRESENT ILLNESS:      The patient is a 78 y.o. gentleman initially admitted with C5 spine fracture and bilateral nasal bone fracture initial admit to trauma ICU. Patient initially presented with fall from standing height, stated that he was at a casino table when he felt dizzy and lightheaded and fell forward, he states that he fell on the front of his face but denies any loss of consciousness. Cardiology was consulted for bradycardia and syncope. Patient reportedly does have a history of A. fib however he has not been on AC likely secondary to multiple falls however no records available in EMR. Reportedly when he was brought to the ER he was also found to be hypoglycemic with blood glucose in the 40s. Patient does have a history of coronary artery bypass surgery in 2005 but no records available. Past Medical History:   has no past medical history on file. Past Surgical History:   has no past surgical history on file.      Home Medications:    Prior to Admission medications    Not on File      Current Facility-Administered Medications: sodium chloride flush 0.9 % injection 5-40 mL, 5-40 mL, IntraVENous, 2 times per day  sodium chloride flush 0.9 % injection 5-40 mL, 5-40 mL, IntraVENous, PRN  0.9 % sodium chloride infusion, , IntraVENous, PRN  polyethylene glycol (GLYCOLAX) packet 17 g, 17 g, Oral, Daily  lactated ringers infusion, , IntraVENous, Continuous  ondansetron (ZOFRAN) injection 4 mg, 4 mg, IntraVENous, Q6H PRN  acetaminophen (TYLENOL) tablet 1,000 mg, 1,000 mg, Oral, 3 times per day  oxyCODONE (ROXICODONE) immediate release tablet 5 mg, 5 mg, Oral, Q6H PRN  lidocaine 1 % injection 20 mL, 20 mL, IntraDERmal, Once    Allergies:  Patient has no known allergies. Social History:        Family History: family history is not on file. REVIEW OF SYSTEMS:    Constitutional: there has been no unanticipated weight loss. There's been No change in energy level, No change in activity level. Eyes: No visual changes or diplopia. No scleral icterus. ENT: No Headaches  Cardiovascular: As above. Respiratory: No previous pulmonary problems, No cough  Gastrointestinal: No abdominal pain. No change in bowel or bladder habits. Genitourinary: No dysuria, trouble voiding, or hematuria. Musculoskeletal:  No gait disturbance, No weakness or joint complaints. Integumentary: No rash or pruritis. Neurological: No headache, diplopia, change in muscle strength, numbness or tingling. No change in gait, balance, coordination, mood, affect, memory, mentation, behavior. Psychiatric: No anxiety, or depression. Endocrine: No temperature intolerance. No excessive thirst, fluid intake, or urination. No tremor. Hematologic/Lymphatic: No abnormal bruising or bleeding, blood clots or swollen lymph nodes. Allergic/Immunologic: No nasal congestion or hives. PHYSICAL EXAM:      BP (!) 174/85   Pulse 56   Temp 97.9 °F (36.6 °C) (Oral)   Resp 14   Ht 6' (1.829 m)   Wt 200 lb 2.8 oz (90.8 kg)   SpO2 97%   BMI 27.15 kg/m²    Constitutional and General Appearance: alert, cooperative, no distress and appears stated age  HEENT: PERRL, no cervical lymphadenopathy. No masses palpable.  Normal oral mucosa  Respiratory:  Normal excursion and expansion without use of accessory muscles  Resp Auscultation: Good respiratory effort. No for increased work of breathing. On auscultation: clear to auscultation bilaterally  Cardiovascular: The apical impulse is not displaced  Heart tones are crisp and normal. regular S1 and S2.  Jugular venous pulsation Normal  The carotid upstroke is normal in amplitude and contour without delay or bruit  Peripheral pulses are symmetrical and full   Abdomen:   No masses or tenderness  Bowel sounds present  Extremities:   No Cyanosis or Clubbing   Lower extremity edema: No   Skin: Warm and dry  Neurological:  Alert and oriented. Moves all extremities well  No abnormalities of mood, affect, memory, mentation, or behavior are noted        Labs:     CBC:   Recent Labs     10/21/22  1944 10/22/22  0524   WBC 4.6 4.2   HGB 11.5* 11.2*   HCT 33.8* 32.6*    111*     BMP:   Recent Labs     10/21/22  1944 10/21/22  2207 10/22/22  0524     --  134*   K 3.1*  --  3.8   CO2 22  --  23   BUN 10  --  8   CREATININE 0.81  --  0.65*   LABGLOM >60  --  >60   GLUCOSE 61* 120* 141*     BNP: No results for input(s): BNP in the last 72 hours. PT/INR:   Recent Labs     10/21/22  1944   PROTIME 11.7   INR 1.1     APTT:No results for input(s): APTT in the last 72 hours. CARDIAC ENZYMES:No results for input(s): CKTOTAL, CKMB, CKMBINDEX, TROPONINI in the last 72 hours. FASTING LIPID PANEL:No results found for: HDL, LDLDIRECT, LDLCALC, TRIG  LIVER PROFILE:  Recent Labs     10/21/22  1944   AST 35   ALT 26   LABALBU 2.9*     DATA:    Diagnostics:    EKG:   ECHO: Ordered    IMPRESSION:    Status post fall with dizziness and LOC- doesn't recall it. Patient was hypoglycemic on arrival with a blood glucose of 43. History of A. fib not on any AC reportedly secondary to multiple falls. Reported bradycardia. History of CABG in 2005 as per patient. No records available.       RECOMMENDATIONS:  Await echocardiogram rule out structural heart abnormality. Patient has been asymptomatic and vitally stable with bradycardia in the low 50s. Hold Coreg and will continue to monitor on telemetry. Increase Norvasc. Consulted EP. Will follow. Discussed with patient and Nurse. José Lopez MD       Cardiovascular Fellow  10/22/2022, 7:49 AM     Attending Cardiologist Addendum: I have reviewed and performed the history, physical, subjective, objective, assessment, and plan with the student/resident/fellow/APN and agree with the note. I performed the history and physical personally. I have made changes to the note above as needed. ECG: Atrial fibrillation with slow ventricular response  Right bundle branch block    ? Symptomatic bradycardia  Hold BB  Check 2d echo  Consult EP for possible need for PPM    Thank you for allowing me to participate in the care of this patient, please do not hesitate to call if you have any questions. Yudelka Sauceda DO, [facility] [place], [address], [address], [place] [address] [facility]  [facility]. [employer]  [phone]

## 2022-10-22 NOTE — ED NOTES
Pt came to ED via [facility] d/t syncopal episode and head lac. EMS states pt was at casino when he fell and hit head. EMS states pts wife saw seizure, wife denies seeing seizure to RN. Pt has hx of open heart at 12years old, and stage 3 pancreatic cancer currently going thru chemotherapy. Pt seems amnesic to some events. - blood thinners, - chest pain and sob. Pt AOx2.  Pt currently connected to monitor w wife at bedside      Orestes Lopez RN  10/21/22 2007

## 2022-10-22 NOTE — CASE COMMUNICATION
SW met with patient to complete SBIRT. Patient states he does not drink alcohol or use drugs. Patient states he has not drank in a long time. When he does, it is only 1-2 drinks. Denies current depression. States cancer diagnosis has led to sadness periodically but is able to manage symptoms. Alcohol Screening and Brief Intervention        Recent Labs     10/21/22  1944   ALC <10       Alcohol Pre-screening  (MEN ONLY) How many times in the past year have you had 5 or more drinks in a day?: None       Alcohol Screening Audit       Drug Pre-Screening   How many times in the past year have you used a recreational drug or used a prescription medication for nonmedical reasons?: None    Drug Screening DAST       Mood Pre-Screening (PHQ-2)  During the past two weeks, have you been bothered by little interest or pleasure in doing things?: No  During the past two weeks, have you been bothered by feeling down, depressed, or hopeless?: No    Mood Pre-Screening (PHQ-9)         I have interviewed Gregorio Montoya, [id number] regarding  His alcohol consumption/drug use and risk for excessive use. Screenings were negative. Patient  N/A intervention at this time.      Deferred []    Completed on: 10/22/2022   BRUNILDA Fitzpatrick

## 2022-10-22 NOTE — PROGRESS NOTES
[address] [facility] [address]     Emergency/Trauma Note    PATIENT NAME: Meche Westbrook    Shift date: 10/21/2022  Shift day: Friday   Shift # 3    Room # 3210/4508-58   Name: Meche Westbrook            Age: 78 y.o. Gender: male          [employer]: Jaimie G[address] of [employer]: Unknown    Trauma/Incident type: Adult Trauma Consult  Admit Date & Time: 10/21/2022  7:31 PM  TRAUMA NAME: N/A    ADVANCE DIRECTIVES IN CHART? No    NAME OF DECISION MAKER: Per next of kin hierarchy, patient's spouse, Billye Klinefelter ([phone]), is patient's primary decision maker. RELATIONSHIP OF DECISION MAKER TO PATIENT: Patient's Spouse    PATIENT/EVENT DESCRIPTION:  Meche Westbrook is a 78 y.o. male who arrived to ED25 and was paged out as an \"Adult Trauma Consult\" due to a \"Fall. \" Per report, patient sustained a \"broken neck and nose. \" Patient was resting in hospital bed, with spouse present at bedside, when  visited. Pt to be admitted to 3006/3006-01. SPIRITUAL ASSESSMENT-INTERVENTION-OUTCOME:   responded to page and gathered patient information outside room.  introduced herself to patient and spouse. Per patient, he is a \"[facility]o\" patient and has undergone treatment over the last seven weeks. Patient complained of being cold and uncomfortable in hospital bed.  provided support, care and an active listening presence with patient and spouse.  provided prayer at bedside for patient's healing, treatment and for strength. Patient and family thanked  for visit and care. PATIENT BELONGINGS:  N/A    ANY BELONGINGS OF SIGNIFICANT VALUE NOTED:  N/A    REGISTRATION STAFF NOTIFIED? Yes      WHAT IS YOUR SPIRITUAL CARE PLAN FOR THIS PATIENT?:  Chaplains can make follow-up visit, per request. Sandor Andrews can be reached 24/7 via [employer].      10/22/22 1607   Encounter Summary   Service Provided For: Patient and family together   Referral/Consult From: Multi-disciplinary team  (Adult Trauma Consult)   Support System Spouse; Family members   Last Encounter  10/21/22   Complexity of Encounter Moderate   Begin Time 0301   End Time  0349   Total Time Calculated 48 min   Encounter    Type Initial Screen/Assessment   Crisis   Type Trauma   Spiritual/Emotional needs   Type Spiritual Support   Assessment/Intervention/Outcome   Assessment Anxious; Fearful;Calm; Hopeful;Tearful   Intervention Active listening;Discussed belief system/Mosque practices/brandie;Discussed illness injury and its impact; Discussed meaning/purpose;Discussed relationship with God;Explored/Affirmed feelings, thoughts, concerns;Explored Coping Skills/Resources;Nurtured Hope;Prayer (assurance of)/[place];Sustaining Presence/Ministry of presence   Outcome Comfort;Coping;Receptive   Plan and Referrals   Plan/Referrals Continue to visit, (comment)  (as needed)     Electronically signed by Guadalupe Fowler on 10/22/2022 at 7:10 AM.  Glynn Proctor  [phone]

## 2022-10-22 NOTE — PROGRESS NOTES
Trauma Tertiary Survey    Admit Date: 10/21/2022  Hospital day 1    [facility]     No past medical history on file. Scheduled Meds:   sodium chloride flush  5-40 mL IntraVENous 2 times per day    polyethylene glycol  17 g Oral Daily    acetaminophen  1,000 mg Oral 3 times per day    lidocaine  20 mL IntraDERmal Once    atorvastatin  80 mg Oral Daily    [Held by provider] carvedilol  25 mg Oral BID    insulin glargine  18 Units SubCUTAneous QAM    amLODIPine  5 mg Oral Daily    dexamethasone  4 mg Oral Daily     Continuous Infusions:   sodium chloride      lactated ringers 100 mL/hr at 10/22/22 1646    dextrose       PRN Meds:sodium chloride flush, sodium chloride, ondansetron, oxyCODONE, hydrALAZINE, glucose, dextrose bolus **OR** dextrose bolus, glucagon (rDNA), dextrose, bacitracin    Subjective:     Patient has complaints of mild cervical pain. .  Pain is mild. C-collar in place. No numbness or weakness down extremities. No other complaints    Objective:   Patient Vitals for the past 8 hrs:   BP Temp Temp src Pulse Resp SpO2   10/22/22 1615 (!) 154/90 97.7 °F (36.5 °C) Oral 67 18 --   10/22/22 1600 137/81 -- -- 61 18 (!) 88 %   10/22/22 1530 (!) 156/87 -- -- 58 14 95 %   10/22/22 1500 (!) 159/91 -- -- 72 21 97 %   10/22/22 1330 (!) 150/84 -- -- 65 17 97 %   10/22/22 1317 -- -- -- 65 21 98 %   10/22/22 1300 (!) 155/84 -- -- 67 21 100 %   10/22/22 1230 (!) 150/101 -- -- 63 (!) 35 97 %   10/22/22 1215 -- 98.6 °F (37 °C) Oral 59 22 97 %   10/22/22 1200 (!) 171/96 -- -- 71 22 94 %   10/22/22 1130 (!) 155/88 -- -- 59 29 95 %   10/22/22 1100 (!) 163/89 -- -- 63 22 99 %   10/22/22 1030 (!) 157/91 -- -- 61 24 99 %   10/22/22 1000 (!) 170/89 -- -- 62 15 98 %   10/22/22 0959 -- 98.4 °F (36.9 °C) Oral -- -- --   10/22/22 0935 -- -- -- 60 -- 98 %   10/22/22 0930 (!) 160/111 -- -- 64 17 98 %       I/O last 3 completed shifts:   In: 216.1 [I.V.:216.1]  Out: 500 [Urine:500]  I/O this shift:  In: 608.3 [I.V.:608.3]  Out: 1905 [SNERJ:2317]    Radiology:  MRI CERVICAL SPINE WO CONTRAST   Final Result   Previously described C5 posterior element fracture is not as well delineated   on the current exam.      Prevertebral soft tissue swelling/fluid from C2-C5. Multilevel degenerative change with bilateral foraminal narrowing as   described above. CTA NECK W CONTRAST   Preliminary Result   No evidence of dissection or acute injury in bilateral common carotid   arteries, bilateral internal carotid arteries and bilateral vertebral   arteries in the neck. In the soft tissues of the neck there is no acute process. Fracture involving the spinous process of C5 vertebra, redemonstrated. CT THORACIC SPINE TRAUMA RECONSTRUCTION   Final Result   1. No acute lumbar spine or thoracic spine fracture. 2. Multilevel mild degenerative changes are seen. CT LUMBAR SPINE TRAUMA RECONSTRUCTION   Final Result   1. No acute lumbar spine or thoracic spine fracture. 2. Multilevel mild degenerative changes are seen. CT CHEST ABDOMEN PELVIS W CONTRAST Additional Contrast? None   Preliminary Result   1. No thoracic or abdominal aortic aneurysm or dissection. 2. Atherosclerosis with coronary artery involvement and postoperative changes   from prior CABG. 3. Postoperative changes seen related to reported history of pancreatic   neoplasm, with a 19 mm left adrenal nodule which could represent adrenal   metastasis. Correlate with any previous outside CT imaging as no comparisons   are available within PACS for my review. 4. There is a small fluid collection seen adjacent to the pancreatic   resection site and hepaticojejunostomy, which could represent a small   postoperative seroma or lymphocele. No solid-appearing metastasis noted   elsewhere. 5. No acute process seen within the abdomen or pelvis. 6. Trace amount of ascites seen in the right lower quadrant of uncertain   significance.          CT HEAD WO CONTRAST Final Result   Head CT: No acute intracranial abnormality detected. Mildly impacted and angulated nasal bone fractures bilaterally. Cervical spine CT: Potentially unstable fracture involving the posterior   elements of C5. Findings were discussed with Jeremiah KABA at 11:12 pm on 10/21/2022. CT CERVICAL SPINE WO CONTRAST   Final Result   Head CT: No acute intracranial abnormality detected. Mildly impacted and angulated nasal bone fractures bilaterally. Cervical spine CT: Potentially unstable fracture involving the posterior   elements of C5. Findings were discussed with Jeremiah KABA at 11:12 pm on 10/21/2022. XR CHEST PORTABLE   Final Result   Cardial pericardial silhouette enlargement. Differential considerations   include cardiomyopathy and pericardial effusion. No acute infiltrate identified. PHYSICAL EXAM:   GCS:  4 - Opens eyes on own   6 - Follows simple motor commands  5 - Alert and oriented    Pupil size:  Left 3 mm Right 3 mm  Pupil reaction: Yes  Wiggles fingers: Left Yes Right Yes  Hand grasp:   Left normal   Right normal  Wiggles toes: Left Yes    Right Yes  Plantar flexion: Left normal  Right normal    BP (!) 154/90   Pulse 67   Temp 97.7 °F (36.5 °C) (Oral)   Resp 18   Ht 6' (1.829 m)   Wt 200 lb 2.8 oz (90.8 kg)   SpO2 (!) 88%   BMI 27.15 kg/m²   General appearance: alert, appears stated age, and cooperative  Head: Normocephalic, without obvious abnormality, atraumatic  Eyes: conjunctivae/corneas clear. PERRL, EOM's intact. Fundi benign. Ears: normal TM's and external ear canals both ears  Nose: Nares normal. Septum midline. Mucosa normal. No drainage or sinus tenderness.   Throat: lips, mucosa, and tongue normal; teeth and gums normal  Neck: no adenopathy, no carotid bruit, no JVD, supple, symmetrical, trachea midline, and thyroid not enlarged, symmetric, no tenderness/mass/nodules  Lungs: clear to auscultation bilaterally  Chest wall: no tenderness  Heart: regular rate and rhythm, S1, S2 normal, no murmur, click, rub or gallop  Abdomen: soft, non-tender; bowel sounds normal; no masses,  no organomegaly  Extremities: extremities normal, atraumatic, no cyanosis or edema  Pulses: 2+ and symmetric  Skin: Skin color, texture, turgor normal. No rashes or lesions        Spine:     Spine Tenderness ROM   Cervical Not assessed, ccollar  Not Indicated   Thoracic 0 /10 Normal   Lumbar 0 /10 Normal     Musculoskeletal    Joint Tenderness Swelling ROM   Right shoulder absent absent normal   Left shoulder absent absent normal   Right elbow absent absent normal   Left elbow absent absent normal   Right wrist absent absent normal   Left wrist absent absent normal   Right hand grasp absent absent normal   Left hand grasp absent absent normal   Right hip absent absent normal   Left hip absent absent normal   Right knee absent absent normal   Left knee absent absent normal   Right ankle absent absent normal   Left ankle absent absent normal   Right foot absent absent normal   Left foot absent absent normal           CONSULTS:       PROCEDURES: none    INJURIES:        Patient Active Problem List   Diagnosis    Closed fracture of spinous process of cervical vertebra, initial encounter (HCC)         Assessment/Plan:   No further imaging required

## 2022-10-22 NOTE — ED NOTES
Pt refused IV potassium and stated when he had it before he could not tolerate it.  Resident notified      Jessie Carmona, RN  10/22/22 9767

## 2022-10-22 NOTE — CARE COORDINATION
Transition planning  Call made to [facility], they provide contact number for [facility] that provides service in [place], [place] ([phone]), CM phoned spoke with Gabriella she requests pts demographics provided, CM c/b number provided. Gabriella informs supervisor will call back.

## 2022-10-22 NOTE — PROGRESS NOTES
[address] [facility] [address]  PROGRESS NOTE    Shift date: 10/22/22    Shift day: Saturday   Shift # 1    Room # 0101/5352-14   Name: Lydia Trejo                Baptist: Baptist     Referral: Routine Visit    Admit Date & Time: 10/21/2022  7:31 PM    Assessment:  Lydia Trejo is a 78 y.o. male in the hospital due to a fall, as stated by patient. Upon entering the room writer observed patient lying in bed; patient appeared to be calm and seemed to be coping well. Patient welcomed  into the room, and through conversation shared his experience and feelings of hopefulness. Intervention:  Writer introduced self and title as  Writer offered space for the patient  to express feelings, needs, and concerns and provided a ministry presence.  listened to the patient's experience and learned that he has been undergoing treatment for pancreatic cancer, had been feeling good the day of his injury, and went out for the day with is wife; the two were enjoying their time at a casino when he apparently lost consciousness.  explored sources of support and learned that the patient receives comfort and strength from his brandie, his wife, his three children, and his three grandsons.  explored sources of hope and learned that the patient feels positive about his prognosis, and trusts that he will recover well and soon.  provided a blessing of peace. Outcome:  Patient engaged in conversation and expressed gratitude for the visit. Plan:  Chaplains will remain available to offer spiritual and emotional support as needed.      10/22/22 1048   Encounter Summary   Service Provided For: Patient   Referral/Consult From: [facility]   [employer] Spouse   Last Encounter  10/22/22   Complexity of Encounter Low   Begin Time 1000   End Time  1015   Total Time Calculated 15 min   Spiritual/Emotional needs   Type Spiritual Support   Assessment/Intervention/Outcome Assessment Calm;Coping; Hopeful   Intervention Active listening;Explored/Affirmed feelings, thoughts, concerns;Explored Coping Skills/Resources;Sustaining Presence/Ministry of presence   Outcome Comfort;Engaged in conversation;Expressed Gratitude   Plan and Referrals   Plan/Referrals Continue Support (comment)       Electronically signed by Mary Gilbert, on 10/22/2022 at 10:49 AM.  [facility]  [phone]

## 2022-10-22 NOTE — ED NOTES
HR dropped into the 30s, resident and attending notified.  Code cart placed at bedside      [facility]  10/21/22 9201

## 2022-10-22 NOTE — PROGRESS NOTES
Physical Therapy          Physical Therapy Cancel Note      DATE: 10/22/2022    NAME: Nadine Wood  MRN: [id number]   : 1955      Patient not seen this date for Physical Therapy due to:    Per Neuro surgery note: CT C-spine reveals possibly unstable posterior fracture of the C5. Plan: Obtain MRI C-spine . MRI C-spine ordered, will await for test to be done and results posted. P:M off the floor for MRI.   Electronically signed by Tg Conn PT on 10/22/2022 at 8:54 AM

## 2022-10-22 NOTE — PROGRESS NOTES
Date of Service: 01/02/2020    PROGRESS NOTE    The patient has been seen for a session of medication management with psychotherapy.  The psychotherapy process has been 20 minutes long.    CHIEF COMPLAINT:  Inattentiveness, distractibility.    Regarding his attention deficit symptoms, he has been doing well with his current dose of medication.  He does not have any difficulties focusing or being distracted.  He completes tasks at work and he does not have any difficulties with work performance.  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  The patient wants to be enrolled in college in [employer] and eventually have his own nursery down the road.He still has a good relationship with his girlfriend of 2 years, who gets a degree in music and also in mathematics.    The patient reports that he has been with his family during the holiday period of time and always thinks about his mother at this time of the year.The patient is planning to travel as usual to [place] by the end of winter to visit with his mother's brother, his uncle.He is involved in family life.  He is teaching his sister how to drive and he feels close to his family.  He is still living at home with his sister and his father.He does not have any difficulties with work.  He talks about being stressed out at holiday period of time when there was a peak of the sale season and he had to put in extra hours.    His sleep and appetite are good.  He wants to continue this medication.  He holds Vyvanse during the weekends and he overeats, but he did not gain excessive weight, he said.    His attention deficit disorder is a stable condition.  He will continue current medications and maintenance treatment.    During psychotherapy, we talked about plans for near future of going to college and what are the pros and cons regarding this issue.  We talked  Speech Language Pathology  Facility/Department: [facility]  Initial Speech/Language/Cognitive Assessment    NAME: Trever Ortiz  : 1955   MRN: [id number]  ADMISSION DATE: 10/21/2022  ADMITTING DIAGNOSIS: has Closed fracture of spinous process of cervical vertebra, initial encounter [facility]) on their problem list.    DATE ONSET: 10/21/2022      Date of Eval: 10/22/2022   Evaluating Therapist: SATISH Bass      RECENT RESULTS  CT OF HEAD/MRI:   CT Head - 10/21/2022  Head CT: No acute intracranial abnormality detected. Mildly impacted and angulated nasal bone fractures bilaterally. Cervical spine CT: Potentially unstable fracture involving the posterior   elements of C5. Primary Complaint:   Clifton Camp is a 77 yo man who presented from fall (sitting or standing height) while at [facility]. His PMH is significant for chemotherapy (just completed last week); he and his wife decided to take a trip; they ended up in [facility]. Pt's injuries include:  Unstable fx involviing posterior C5  Bilateral Nasal bone fx with laceration of nasal bridge            IMPRESSIONS  WFL Cognition          RECOMMENDATIONS  Skilled ST is not warranted at this time                  Pain:  Pain Assessment  Pain Assessment: 0-10  Pain Level: 2  Pain Location: Scrotum  Multiple Pain Sites: No    Vision/ Hearing       Assessment:  Cognitive Diagnosis: WFL   Diagnosis: WFL    Recommendations:  Recommendations  Requires SLP Intervention: No  Patient Education Response: Verbalizes understanding             Plan:   Speech Therapy Prognosis  Prognosis Considerations: Age; Family/Community Support;Severity of Impairments;Medical Diagnosis; Medical Prognosis;Participation Level;Previous Level of Function;Potential  Individuals consulted  Consulted and agree with results and recommendations: Patient; Family member  Family member consulted: wife    Goals:      Patient/family involved in about the importance of consistency in relationships and with his job.  He denies any current feelings of sadness, hopelessness or suicidal ideation.    MENTAL STATUS EXAMINATION:  The patient has been cooperative without abnormal movements.  Speech has been goal oriented, normal rate, normal tone.  Thought process has been logical.  His mood and affect have been euthymic.  He denies any suicidal or homicidal thoughts.  He denied any psychotic symptoms such as hallucinations or paranoid ideation.  His insight of illness is good.  His judgment based on compliance with treatment is good.    DIAGNOSIS:  Attention deficit hyperactivity disorder, inattentive type.    TREATMENT PLAN:  Continue current treatment.  Medication dispensed for 2 months.    Return in 3 months.      Dictated By: Yareli Christianson MD  Signing Provider: Yareli Christianson MD    FU/msc ([id number])  DD: 01/02/2020 09:01:16 TD: 01/02/2020 21:39:05    Copy Sent To:      developing goals and treatment plan: yes    Subjective:   Previous level of function and limitations:  x36 years. 3 children and 3 grandchildren. Lives in [facility]. 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[employer]; still working [address] [place] [address]                    Objective:            Motor Speech  Apraxic Characteristics: None  Dysarthric Characteristics: None  Intelligibility: No impairment    Auditory Comprehension  Comprehension: Within Functional Limits         Expression  Primary Mode of Expression: Verbal    Verbal Expression  Verbal Expression: Within functional limits                   Cognition:      Orientation  Overall Orientation Status: Within Functional Limits  Attention  Attention: Within Functional Limits  Memory  Memory: Within Functional Limits  Problem Solving  Problem Solving: Within Functional Limits    Additional Assessments:             Prognosis:  Speech Therapy Prognosis  Prognosis Considerations: Age; Family/Community Support;Severity of Impairments;Medical Diagnosis; Medical Prognosis;Participation Level;Previous Level of Function;Potential  Individuals consulted  Consulted and agree with results and recommendations: Patient; Family member  Family member consulted: wife    Education:  Patient Education Response: Verbalizes understanding          Therapy Time:   Individual Concurrent Group Co-treatment   Time In           Time Out           Minutes                   Electronically signed by SATISH Julian on 10/22/2022 at 11:15 AM

## 2022-10-22 NOTE — CARE COORDINATION
10/22/22 1735   Service Assessment   Patient Orientation Alert and Oriented   Cognition Alert   History Provided By Patient;Spouse   Primary Caregiver Self   Accompanied By/Relationship pt's wife Gladys Choudhary Spouse/Significant Other   Patient's [address] is: Legal Next of Kin   PCP Verified by CM Yes  Jennifer Lim)   Last Visit to PCP Within last 3 months   Prior Functional Level Independent in ADLs/IADLs   Current Functional Level Assistance with the following:   Can patient return to prior living arrangement Yes   Ability to make needs known: Good   Family able to assist with home care needs: Yes   Would you like for me to discuss the discharge plan with any other family members/significant others, and if so, who? Yes  (with spouse)   Social/Functional History   Lives With Spouse   Type of [address] One level   Home Access Stairs to enter without rails   Entrance Stairs - Number of Steps 2   Receives Help From [address]  (Prior to admission)   Transfer Assistance Independent   Active  Yes   Discharge Planning   Type of [address] Prior To Admission Other (Comment)  (DME 2-ilda, RW)   Potential [address] Medications No   Type of [address] PT;OT;Nursing Services   Patient expects to be discharged to: [facility] [address] One story   History of falls? 1  (Not prior to incident that brought pt to the hospital)   [phone] Discharge   Transition of Care Consult (CM Consult) [facility] Provided?  No   Mode of Transport at Discharge Self  (Pt's wife to provide transportation)   Condition of Participation: Discharge Planning   The Plan for Transition of Care is related to the following treatment goals: Mobility, possible need for PPM   The Patient and/or Patient Representative was provided with a Choice of Provider? Patient Representative; Patient   Name of the Patient Representative who was provided with the Choice of Provider and agrees with the Discharge Plan? Christina Pinon   The Patient and/Or Patient Representative agree with the Discharge Plan? Yes   Freedom of Choice list was provided with basic dialogue that supports the patient's individualized plan of care/goals, treatment preferences, and shares the quality data associated with the providers? Yes  (Pt and wife request referral to [facility] in [facility])   Met with pt and wife at bedside discussed transition plans. Pt informs he will be going home, he is agreeable to [facility]. has utilized [facility] in the past. CM will make referral.  Pt informs that the PCP on file is Zeina Collins which is his PCP's wife also an MD in the same office building. His PCP is Rima Huang, if not able to find in Epic he is okay with his medical record being sent to [facility] as his information will make it to his PCP from her. Pt informs he has Medicare Part A and has completed the IMM/   Pt informs he has completed MRI awaiting results to determine plan of care, and he is being evaluated on Monday by EP to determine if he will require a PPM.   Pt's wife to provide transportation home.

## 2022-10-22 NOTE — PLAN OF CARE
--  NO ACUTE NEUROSURGICAL INTERVENTION AT THIS TIME    Treat in [place] collar    NEUROSURGERY TO SIGN OFF     Please contact Neurosurgery with any questions. PATIENT TO FOLLOW UP IN CLINIC: 2 weeks   ---  Follow-up with Neurosurgery  [address]/[facility] (Medical Office Building 2)  [address]  Call [phone] for an appointment.   --

## 2022-10-22 NOTE — DISCHARGE INSTRUCTIONS
Spinal Fracture Discharge Instructions     Thank you for choosing [facility] for your recovery needs. The following instructions will help to ensure your comfort and that you are well prepared for recovery. Follow-up Visit:   The office is located at:    [address],  [address]    [facility], main floor    [address]    [phone]      [x] Please obtain upright x-rays of your Cervical spine 1-2 days prior to appointment. Please also call your primary care physician to schedule an appointment for further evaluation and care. Diet:   You may resume your regular diet. Be sure to eat a well-balanced diet. Protein promotes wound healing. Pain medication and decreased activity can cause constipation. Drink 8-10 glasses of water a day, eat fresh fruits and vegetables, and add prunes, raisins and bran cereals to your diet if you do become constipated. A stool softener taken 1-2 times a day is helpful. Dulcolax suppositories or Fleets enemas are also available without a prescription. Call our office if the problem continues. Activity and Exercise:   No driving until you are seen in the office. Avoid riding in a car for the first two weeks if possible, until you come to the office for your scheduled follow-up. Start taking short, frequent walks in the beginning. Randall, more frequent walks throughout the day are more beneficial than one long walk each day. You may gradually increase the distance; as tolerated. Your brace will help give support to your muscles while you walk. If your pain increases, you may be walking too much or too far. Try backing off for a day or two and then resume slowly. No lifting greater than 5 lbs (gallon of milk). No bending at the waist. Instead, bend at the knees and squat to pick something up.    If physical therapy has been prescribed, you are not to perform range of motion, flexion, extension or lateral bending. Brace: If a back or neck brace is prescribed, it should be worn at all times except while laying in bed. Pain Management:   You will be given a prescription for pain medication. Our hope is that you will eventually be weaned off all pain medications. Try not take the pain medicine unless you need to. If you feel that you do not need something that strong, you may use regular or extra-strength Tylenol instead. DO NOT drink alcohol, drive or operate heavy machinery while taking your pain medications. Notify the office if your pain is not controlled or you need a medication refill before your appointment. YOU SHOULD CALL THE OFFICE -[phone] IF YOU HAVE ANY OF THE FOLLOWING:   Increased pain or pain not relieved with current medications   New or increased pain, numbness or tingling in the legs, as well as new or increased balance or coordination issues. New difficulty with urinating or holding your bladder or your bowels     *If you are unable to contact someone at the office and your symptoms persist or increase, call 911 or go to the emergency department.

## 2022-10-22 NOTE — H&P
TRAUMA HISTORY AND PHYSICAL EXAMINATION    PATIENT NAME: Eleanor River: 1955  MEDICAL RECORD NO. [id number]   DATE: 10/22/2022  PRIMARY CARE PHYSICIAN: Lisha Fowler MD  PATIENT EVALUATED AT THE REQUEST OF : Jt TAVERAS   []Trauma Alert     [] Trauma Priority     [x]Trauma Consult. IMPRESSION:     81-year-old male with C5 spinous process fracture extending into the ring, bilateral nasal bone fracture    MEDICAL DECISION MAKING AND PLAN:       Admit to TICU under trauma service  Neurosurgery recommendations for C5 fracture  Will obtain plastics consult in a.m. for bilateral nasal bone fracture  Multimodal pain therapy  Telemetry -A. fib, bradycardic; will get cardiology on board  Will need syncope work-up  Monitor blood glucose every 4 hour -hypoglycemia protocol      CONSULT SERVICES    [x] Neurosurgery     [] Orthopedic Surgery    [] Cardiothoracic     [x] Facial Trauma    [] Plastic Surgery (Burn)    [] Pediatric Surgery     [] Internal Medicine    [] Pulmonary Medicine    [] Other:        HISTORY:     Chief Complaint:  \"I passed out\"    INJURY SUMMARY  C5 spinous process fracture extending into posterior ring  Bilateral nasal bone fracture      GENERAL DATA  Age 78 y.o.  male   Patient information was obtained from patient and spouse/SO. History/Exam limitations: none.   Patient presented to the Emergency Department by ambulance where the patient received IV and cervical collar prior to arrival.  Injury Date: 10/22/2022   Approximate Injury Time: 7PM        Transport mode:   [x]Ambulance      [] Helicopter     []Car       [] Other  Referring Hospital: [place] [address], (e.g., home, farm, industry, street)  Specific Details of Location (e.g., bedroom, kitchen, garage): [address] [place] [address]  [x] Fall    [x]From Standing     []From Height  Ft     []Down Stairs ___steps        HISTORY:     Jerrod Waggoner is a 78 y.o. male that presented to the Emergency Department following fall from likely standing height. Patient and his wife are from [place] and went to [address] Patient states that he was feeling okay but then woke up in the ambulance. Per wife, she was in the next isle over and heard commotion and found her  on the ground. Patient was brought to the emergency department and underwent CT scans showing a C5 spinous process fracture as well as bilateral nasal bone fracture. Patient's heart rate dropped into the 30s in the emergency department. Additionally, patient did have a blood glucose reading of 43 and was treated. Patient has a remote history of a Whipple for pancreatic adenocarcinoma which was all done in [place]. Patient states that he does have a cardiologist and knows that he has low heart rate. Currently does not have a list of his medications. No longer takes a blood thinner. Does have a history of CABG x3 in 2005. He has a history of cardiac surgery as an infant due to a \"hole in the heart\"    Loss of Consciousness []No   [x]Yes Duration(min)  unknown    [] Unknown     Total Fluids Given Prior To Arrival  mL    MEDICATIONS:   []  None     []  Information not available due to exam limitations documented above  Unknown - -Per chart review from care everywhere, Lipitor 80 mg, Coreg 25 mg, metformin 1000 mg twice daily, Percocet, ozempic, farxiga, Lantus, Compazine, hydrochlorothiazide, potassium      ALLERGIES:   [x]  None    []   Information not available due to exam limitations documented above     Patient has no known allergies.     PAST MEDICAL HISTORY: []  None   []   Information not available due to exam limitations documented above     Pancreatic adenocarcinoma  Diabetes  CAD  Hypertension    FAMILY HISTORY   []   Information not available due to exam limitations documented above  No family history of bleeding or clotting disorders    SOCIAL HISTORY  []   Information not available due to exam limitations documented above  Former smoker, denies alcohol or drugs    Review of Systems:    []   Information not available due to exam limitations documented above    General: Denies fever, chills, night sweats, weight loss, malaise, fatigue  HEENT: Denies sore throat, sinus problems, allergic rhinosinusitis  Card: Denies chest pain, palpitations, orthopnea/PND. Denies h/o murmurs  Pulm: Denies cough, shortness of breath, RAZO  GI:  per HPI; denies history of constipation, diarrhea, hematochezia or melena  : Denies polyuria, dysuria, hematuria  Endo: Denies thyroid problems. +DM  Heme: Denies anemia, h/o bleeding or clotting problems. Neuro: Denies h/o CVA, TIA  Skin: Denies rashes, ulcers  Musculoskeletal: Denies muscle, back pain.       PHYSICAL EXAMINATION:     GLASCOW COMA SCALE  NEUROMUSCULAR BLOCKADE PRIOR TO ARRIVAL     [x]No        []Yes      Variable  Score   Variable  Score  Eye opening [x]Spontaneous 4 Verbal  [x]Oriented  5     []To voice  3   []Confused  4    []To pain  2   []Inapp words 3    []None  1   []Incomp words 2       []None  1   Motor  [x]Obeys  6    []Localizes pain 5    []Withdraws(pain) 4    []Flexion(pain) 3  []Extension(pain) 2    []None  1     GCS Total = 15    PHYSICAL EXAMINATION    VITAL SIGNS:   Vitals:    10/22/22 0013   BP: (!) 160/69   Pulse: 52   Resp: 18   Temp:    SpO2: 93%       General Appearance: alert and oriented to person, place and time, well developed and well- nourished, in no acute distress  Skin: warm and dry, no rash or erythema  Head: normocephalic; laceration on bridge of nose approximately 1 cm x 2  Eyes: pupils equal, round, and reactive to light, extraocular eye movements intact, conjunctivae normal  ENT: tympanic membrane, external ear and ear canal normal bilaterally, nose without deformity, nasal mucosa and turbinates normal without polyps  Neck: C-collar present, supple and non-tender without mass, no thyromegaly or thyroid nodules, no cervical lymphadenopathy  Pulmonary/Chest: Symmetric rise and fall of chest wall, normal effort  Cardiovascular: Irregular rhythm, bradycardia  Abdomen: soft, non-tender, non-distended  Pelvis:  Stable  Back:  No abrasions/lesions. No obvious deformities. No TTP. No step-offs  Extremities: palpable radial, femoral, and pedal pulses b/l  Musculoskeletal: normal range of motion, no joint swelling, deformity or tenderness  Neurologic: reflexes normal and symmetric, no cranial nerve deficit, gait, coordination and speech normal          RADIOLOGY  CT THORACIC SPINE TRAUMA RECONSTRUCTION   Final Result   1. No acute lumbar spine or thoracic spine fracture. 2. Multilevel mild degenerative changes are seen. CT LUMBAR SPINE TRAUMA RECONSTRUCTION   Final Result   1. No acute lumbar spine or thoracic spine fracture. 2. Multilevel mild degenerative changes are seen. CT CHEST ABDOMEN PELVIS W CONTRAST Additional Contrast? None   Preliminary Result   1. No thoracic or abdominal aortic aneurysm or dissection. 2. Atherosclerosis with coronary artery involvement and postoperative changes   from prior CABG. 3. Postoperative changes seen related to reported history of pancreatic   neoplasm, with a 19 mm left adrenal nodule which could represent adrenal   metastasis. Correlate with any previous outside CT imaging as no comparisons   are available within PACS for my review. 4. There is a small fluid collection seen adjacent to the pancreatic   resection site and hepaticojejunostomy, which could represent a small   postoperative seroma or lymphocele. No solid-appearing metastasis noted   elsewhere. 5. No acute process seen within the abdomen or pelvis. 6. Trace amount of ascites seen in the right lower quadrant of uncertain   significance. CT HEAD WO CONTRAST   Final Result   Head CT: No acute intracranial abnormality detected. Mildly impacted and angulated nasal bone fractures bilaterally. Cervical spine CT: Potentially unstable fracture involving the posterior   elements of C5. Findings were discussed with Jeremiah KABA at 11:12 pm on 10/21/2022. CT CERVICAL SPINE WO CONTRAST   Final Result   Head CT: No acute intracranial abnormality detected. Mildly impacted and angulated nasal bone fractures bilaterally. Cervical spine CT: Potentially unstable fracture involving the posterior   elements of C5. Findings were discussed with Jeremiah KABA at 11:12 pm on 10/21/2022. XR CHEST PORTABLE   Final Result   Cardial pericardial silhouette enlargement. Differential considerations   include cardiomyopathy and pericardial effusion. No acute infiltrate identified.                LABS    Labs Reviewed   CBC WITH AUTO DIFFERENTIAL - Abnormal; Notable for the following components:       Result Value    RBC 3.81 (*)     Hemoglobin 11.5 (*)     Hematocrit 33.8 (*)     RDW 14.7 (*)     Lymphocytes 54 (*)     Eosinophils % 0 (*)     All other components within normal limits   COMPREHENSIVE METABOLIC PANEL - Abnormal; Notable for the following components:    Glucose 61 (*)     Calcium 7.8 (*)     Potassium 3.1 (*)     Total Protein 5.0 (*)     Albumin 2.9 (*)     All other components within normal limits   BRAIN NATRIURETIC PEPTIDE - Abnormal; Notable for the following components:    Pro-BNP 1,630 (*)     All other components within normal limits   TOX SCR, BLD, ED - Abnormal; Notable for the following components:    Acetaminophen Level <5 (*)     Salicylate Lvl <1 (*)     All other components within normal limits   GLUCOSE, RANDOM - Abnormal; Notable for the following components:    Glucose 120 (*)     All other components within normal limits   POC GLUCOSE FINGERSTICK - Abnormal; Notable for the following components:    POC Glucose 43 (*)     All other components within normal limits   POC GLUCOSE FINGERSTICK - Abnormal; Notable for the following components:    POC Glucose 140 (*)     All other components within normal limits   D-DIMER, QUANTITATIVE   PROTIME-INR   TROPONIN   TROPONIN   TSH WITH REFLEX   DIGOXIN LEVEL   POC GLUCOSE FINGERSTICK       PROCEDURES (SEE SEPARATE OPERATIVE REPORTS)  []CENTRAL LINE  []OROTRACHEAL INTUBATION  []CHEST TUBE  []ARTERIAL LINE  []OTHER      Laura Rojas DO  10/22/22, 1:28 AM

## 2022-10-22 NOTE — CONSULTS
[address]    Department of Neurosurgery  Resident Consult Note      Reason for Consult: Potentially unstable C5 fracture  Requesting Physician: Dr. Samanta Bhatti  Neurosurgeon:   []Dr. Rosalva Harris  []Dr. Zeynep Cain  []Dr. Gerry Mehta  []Dr. Amberly Wadsworth  []Dr. Kwan Orozco  [x]Dr. Mamie Grijalva    History Obtained From:  patient, spouse    CHIEF COMPLAINT:         Fall    HISTORY OF PRESENT ILLNESS:       The patient is a 78 y.o. male who presents following a fall from sitting height. Past medical history significant for pancreatic cancer on chemotherapy. Patient was at a casino table when he felt dizzy and lightheaded and fell forward. He hit the front of his face but denies any loss of consciousness. He has some mild neck pain and a headache currently. Denies any numbness or weakness. GCS 15.  CT head unremarkable for intracranial abnormality, evidence of mildly impacted and angulated nasal bone fracture bilaterally. CT C-spine shows a potentially unstable fracture involving the posterior elements of C5. Neurological exam unremarkable. PAST MEDICAL HISTORY :       Past Medical History:    No past medical history on file. Past Surgical History:    No past surgical history on file.     Social History:   Social History     Socioeconomic History    Marital status:      Spouse name: Not on file    Number of children: Not on file    Years of education: Not on file    Highest education level: Not on file   Occupational History    Not on file   Tobacco Use    Smoking status: Not on file    Smokeless tobacco: Not on file   Substance and Sexual Activity    Alcohol use: Not on file    Drug use: Not on file    Sexual activity: Not on file   Other Topics Concern    Not on file   Social History Narrative    Not on file     Social Determinants of Health     Financial Resource Strain: Not on file   Food Insecurity: Not on file   Transportation Needs: Not on file   Physical Activity: Not on file Stress: Not on file   Social Connections: Not on file   Intimate Partner Violence: Not on file   Housing Stability: Not on file       Family History:   No family history on file. Allergies:   Patient has no known allergies. Home Medications:  Prior to Admission medications    Not on File       Current Medications:   Current Facility-Administered Medications: potassium chloride 10 mEq/100 mL IVPB (Peripheral Line), 10 mEq, IntraVENous, Q1H  dextrose 10 % infusion, , IntraVENous, Continuous    REVIEW OF SYSTEMS:       General ROS - No fevers, no chills  Ophthalmic ROS - No changes in vision from baseline  ENT ROS -  No sore throat, no rhinorrhea  Respiratory ROS - no cough, no shortness of breath  Cardiovascular ROS - No chest pain  Gastrointestinal ROS - No abdominal pain, no nausea, no vomiting  Genito-Urinary ROS - No dysuria  Musculoskeletal ROS - No neck pain, no back pain  Neurological ROS - No focal weakness or loss of sensation, positive for headache  Dermatological ROS - No lesions, no rash  Vascular/Lymphatic ROS - No edema    PHYSICAL EXAM:       Vitals:    10/22/22 0013   BP: (!) 160/69   Pulse: 52   Resp: 18   Temp:    SpO2: 93%       CONSTITUTIONAL:  Well developed, well nourished, alert and oriented x 3, in no acute distress, nontoxic. No dysarthria, no aphasia. EOMI.     HEAD:  Blood and face laceration, nasal fracture   EYES:  PERRLA, EOMI   ENT:  moist mucous membranes, no stridor, no tracheal deviation   NECK:  no midline tenderness to palpation, no step-offs or deformities   BACK:  no midline tenderness to palpation, no step-offs or deformities    LUNGS:  CTAB, equal air entry bilaterally, no wheezes or rales   CARDIOVASCULAR:  RRR, no murmur   ABDOMEN:  Soft, no rigidity   NEUROLOGIC:  EYE OPENING     Spontaneous - 4 [x]       To voice - 3 []       To pain - 2 []       None - 1 []    VERBAL RESPONSE     Appropriate, oriented - 5 [x]       Dazed or confused - 4 []       Syllables, expletives - 3 []       Grunts - 2 []       None - 1 []    MOTOR RESPONSE     Spontaneous, command - 6 [x]       Localizes pain - 5 []       Withdraws pain - 4 []       Abnormal flexion - 3 []       Abnormal extension - 2 []       None - 1 []            Total GCS: 15    Mental Status:  A & O x3, awake             Cranial Nerves:    cranial nerves II-XII are grossly intact    Motor Exam:    Drift:  absent  Tone:  normal    Motor exam is symmetrical 5 out of 5 all extremities bilaterally    Sensory:    Touch:    Right Upper Extremity:  normal  Left Upper Extremity:  normal  Right Lower Extremity:  normal  Left Lower Extremity:  normal    Deep Tendon Reflexes:    Right Bicep:  2+  Left Bicep:  2+  Right Knee:  2+  Left Knee:  2+    Plantar Response:    Right:  downgoing  Left:  downgoing    Clonus:  absent  Stock's:  absent    Coordination/Dysmetria:  Heel to Shin:  Right:  normal  Left:  normal  Finger to Nose:   Right:  normal  Left:  normal      SKIN:  no rash      LABS AND IMAGING:     Labs:  CBC with Differential:    Lab Results   Component Value Date/Time    WBC 4.6 10/21/2022 07:44 PM    RBC 3.81 10/21/2022 07:44 PM    HGB 11.5 10/21/2022 07:44 PM    HCT 33.8 10/21/2022 07:44 PM     10/21/2022 07:44 PM    MCV 88.7 10/21/2022 07:44 PM    MCH 30.2 10/21/2022 07:44 PM    MCHC 34.0 10/21/2022 07:44 PM    RDW 14.7 10/21/2022 07:44 PM    LYMPHOPCT 54 10/21/2022 07:44 PM    MONOPCT 7 10/21/2022 07:44 PM    BASOPCT 0 10/21/2022 07:44 PM    MONOSABS 0.31 10/21/2022 07:44 PM    LYMPHSABS 2.45 10/21/2022 07:44 PM    EOSABS <0.03 10/21/2022 07:44 PM    BASOSABS <0.03 10/21/2022 07:44 PM     BMP:    Lab Results   Component Value Date/Time     10/21/2022 07:44 PM    K 3.1 10/21/2022 07:44 PM     10/21/2022 07:44 PM    CO2 22 10/21/2022 07:44 PM    BUN 10 10/21/2022 07:44 PM    LABALBU 2.9 10/21/2022 07:44 PM    CREATININE 0.81 10/21/2022 07:44 PM    CALCIUM 7.8 10/21/2022 07:44 PM    LABGLOM >60 10/21/2022 07:44 PM GLUCOSE 120 10/21/2022 10:07 PM       Radiology Review:    CT head/CT C-spine        CT thoracic and lumbar spine: No acute lumbar spine or thoracic spine fracture, multilevel mild degenerative changes are seen            ASSESSMENT AND PLAN:     There is no problem list on file for this patient. A/P:  Cherelle Dawn is a 78 y.o. male who presents following a fall from sitting height with impact to the front of the face. Patient denies any loss of consciousness. On arrival GCS 15 with unremarkable neurological exam.  Evidence of a nasal fracture. CT C-spine reveals possibly unstable posterior fracture of the C5. Patient care will be discussed with attending, will reevaluate patient along with attending     - No neurosurgical interventions planned for now  - CTLS recommendations: Maintain cervical collar   - Obtain MRI C-spine in AM   - Neuro checks per protocol        Additional recommendations may follow    Please contact neurosurgery with any changes in patient's neurologic status. Thank you for your consult.        Logan Cameron MD    PGY-2 neurology resident Physician  Neurosurgery Team - pager [phone]  10/22/2022 12:30 AM

## 2022-10-23 LAB
ABSOLUTE EOS #: <0.03 K/UL (ref 0–0.44)
ABSOLUTE IMMATURE GRANULOCYTE: <0.03 K/UL (ref 0–0.3)
ABSOLUTE LYMPH #: 1.41 K/UL (ref 1.1–3.7)
ABSOLUTE MONO #: 0.15 K/UL (ref 0.1–1.2)
ANION GAP SERPL CALCULATED.3IONS-SCNC: 7 MMOL/L (ref 9–17)
BASOPHILS # BLD: 0 % (ref 0–2)
BASOPHILS ABSOLUTE: <0.03 K/UL (ref 0–0.2)
BUN BLDV-MCNC: 7 MG/DL (ref 8–23)
CALCIUM SERPL-MCNC: 7.7 MG/DL (ref 8.6–10.4)
CHLORIDE BLD-SCNC: 104 MMOL/L (ref 98–107)
CO2: 28 MMOL/L (ref 20–31)
CREAT SERPL-MCNC: 0.54 MG/DL (ref 0.7–1.2)
EOSINOPHILS RELATIVE PERCENT: 0 % (ref 1–4)
GFR SERPL CREATININE-BSD FRML MDRD: >60 ML/MIN/1.73M2
GLUCOSE BLD-MCNC: 146 MG/DL (ref 75–110)
GLUCOSE BLD-MCNC: 151 MG/DL (ref 75–110)
GLUCOSE BLD-MCNC: 190 MG/DL (ref 70–99)
GLUCOSE BLD-MCNC: 308 MG/DL (ref 75–110)
GLUCOSE BLD-MCNC: 348 MG/DL (ref 75–110)
GLUCOSE BLD-MCNC: 362 MG/DL (ref 75–110)
HCT VFR BLD CALC: 33.5 % (ref 40.7–50.3)
HEMOGLOBIN: 11.2 G/DL (ref 13–17)
IMMATURE GRANULOCYTES: 1 %
LYMPHOCYTES # BLD: 46 % (ref 24–43)
MAGNESIUM: 1.6 MG/DL (ref 1.6–2.6)
MCH RBC QN AUTO: 30 PG (ref 25.2–33.5)
MCHC RBC AUTO-ENTMCNC: 33.4 G/DL (ref 28.4–34.8)
MCV RBC AUTO: 89.8 FL (ref 82.6–102.9)
MONOCYTES # BLD: 5 % (ref 3–12)
NRBC AUTOMATED: 0 PER 100 WBC
PDW BLD-RTO: 14.6 % (ref 11.8–14.4)
PHOSPHORUS: 3.7 MG/DL (ref 2.5–4.5)
PLATELET # BLD: 123 K/UL (ref 138–453)
PMV BLD AUTO: 9.1 FL (ref 8.1–13.5)
POTASSIUM SERPL-SCNC: 3.9 MMOL/L (ref 3.7–5.3)
RBC # BLD: 3.73 M/UL (ref 4.21–5.77)
RBC # BLD: ABNORMAL 10*6/UL
SEG NEUTROPHILS: 48 % (ref 36–65)
SEGMENTED NEUTROPHILS ABSOLUTE COUNT: 1.5 K/UL (ref 1.5–8.1)
SODIUM BLD-SCNC: 139 MMOL/L (ref 135–144)
WBC # BLD: 3.1 K/UL (ref 3.5–11.3)

## 2022-10-23 PROCEDURE — 6370000000 HC RX 637 (ALT 250 FOR IP): Performed by: STUDENT IN AN ORGANIZED HEALTH CARE EDUCATION/TRAINING PROGRAM

## 2022-10-23 PROCEDURE — 2580000003 HC RX 258: Performed by: STUDENT IN AN ORGANIZED HEALTH CARE EDUCATION/TRAINING PROGRAM

## 2022-10-23 PROCEDURE — 80048 BASIC METABOLIC PNL TOTAL CA: CPT

## 2022-10-23 PROCEDURE — 83735 ASSAY OF MAGNESIUM: CPT

## 2022-10-23 PROCEDURE — 6360000002 HC RX W HCPCS: Performed by: STUDENT IN AN ORGANIZED HEALTH CARE EDUCATION/TRAINING PROGRAM

## 2022-10-23 PROCEDURE — 36415 COLL VENOUS BLD VENIPUNCTURE: CPT

## 2022-10-23 PROCEDURE — 82947 ASSAY GLUCOSE BLOOD QUANT: CPT

## 2022-10-23 PROCEDURE — 85025 COMPLETE CBC W/AUTO DIFF WBC: CPT

## 2022-10-23 PROCEDURE — 6360000002 HC RX W HCPCS: Performed by: SURGERY

## 2022-10-23 PROCEDURE — 2060000000 HC ICU INTERMEDIATE R&B

## 2022-10-23 PROCEDURE — 84100 ASSAY OF PHOSPHORUS: CPT

## 2022-10-23 RX ORDER — AMLODIPINE BESYLATE 10 MG/1
10 TABLET ORAL DAILY
Status: DISCONTINUED | OUTPATIENT
Start: 2022-10-23 | End: 2022-10-25 | Stop reason: HOSPADM

## 2022-10-23 RX ORDER — INSULIN LISPRO 100 [IU]/ML
0-16 INJECTION, SOLUTION INTRAVENOUS; SUBCUTANEOUS
Status: DISCONTINUED | OUTPATIENT
Start: 2022-10-23 | End: 2022-10-25

## 2022-10-23 RX ORDER — LOSARTAN POTASSIUM 50 MG/1
50 TABLET ORAL DAILY
Status: DISCONTINUED | OUTPATIENT
Start: 2022-10-23 | End: 2022-10-25 | Stop reason: HOSPADM

## 2022-10-23 RX ORDER — INSULIN LISPRO 100 [IU]/ML
0-4 INJECTION, SOLUTION INTRAVENOUS; SUBCUTANEOUS NIGHTLY
Status: DISCONTINUED | OUTPATIENT
Start: 2022-10-23 | End: 2022-10-25

## 2022-10-23 RX ADMIN — AMLODIPINE BESYLATE 10 MG: 10 TABLET ORAL at 09:48

## 2022-10-23 RX ADMIN — LOSARTAN POTASSIUM 50 MG: 50 TABLET, FILM COATED ORAL at 12:59

## 2022-10-23 RX ADMIN — ACETAMINOPHEN 1000 MG: 500 TABLET ORAL at 05:25

## 2022-10-23 RX ADMIN — SODIUM CHLORIDE, POTASSIUM CHLORIDE, SODIUM LACTATE AND CALCIUM CHLORIDE: 600; 310; 30; 20 INJECTION, SOLUTION INTRAVENOUS at 05:29

## 2022-10-23 RX ADMIN — HYDRALAZINE HYDROCHLORIDE 10 MG: 20 INJECTION, SOLUTION INTRAMUSCULAR; INTRAVENOUS at 22:43

## 2022-10-23 RX ADMIN — SODIUM CHLORIDE, PRESERVATIVE FREE 5 ML: 5 INJECTION INTRAVENOUS at 09:09

## 2022-10-23 RX ADMIN — SODIUM CHLORIDE, PRESERVATIVE FREE 10 ML: 5 INJECTION INTRAVENOUS at 00:22

## 2022-10-23 RX ADMIN — INSULIN LISPRO 12 UNITS: 100 INJECTION, SOLUTION INTRAVENOUS; SUBCUTANEOUS at 18:45

## 2022-10-23 RX ADMIN — ATORVASTATIN CALCIUM 80 MG: 80 TABLET, FILM COATED ORAL at 09:06

## 2022-10-23 RX ADMIN — DEXAMETHASONE 4 MG: 4 TABLET ORAL at 09:06

## 2022-10-23 RX ADMIN — INSULIN GLARGINE 18 UNITS: 100 INJECTION, SOLUTION SUBCUTANEOUS at 09:04

## 2022-10-23 ASSESSMENT — PAIN SCALES - GENERAL
PAINLEVEL_OUTOF10: 0

## 2022-10-23 NOTE — ED PROVIDER NOTES
[facility]   Emergency Department  Emergency Medicine Attending Sign-out     Care of Cherry Cummings was assumed from previous attending Dr. Morgan Dumont and is being seen for Loss of Consciousness (Nose lac bleeding controlled )  . The patient's initial evaluation and plan have been discussed with the prior provider who initially evaluated the patient. Attestation  I was available and discussed any additional care issues that arose and coordinated the management plans with the resident(s) caring for the patient during my duty period. Any areas of disagreement with resident's documentation of care or procedures are noted on the chart. I was personally present for the key portions of any/all procedures, during my duty period. I have documented in the chart those procedures where I was not present during the key portions. BRIEF PATIENT SUMMARY/MDM COURSE PER INITIAL PROVIDER:   RECENT VITALS:     Temp: 98.4 °F (36.9 °C),  Heart Rate: 64, Resp: 16, BP: 135/80, SpO2: 95 %    This patient is a 78 y.o. Male with syncope at [facility]. Has a h/o pancreatic cancer. Has lac on nose. Awaiting labs and imaging.        OUTSTANDING TASKS / ADDITIONAL COMMENTS   Found to have cpsine injury   Trauma admission    Delvin Ash MD  Emergency Medicine Attending  [facility]        Eugene Mar MD  10/23/22 9297

## 2022-10-23 NOTE — PROGRESS NOTES
ICU PROGRESS NOTE    PATIENT NAME: Kelly Narayanan RECORD NO. [id number]  DATE: 10/23/2022    PRIMARY CARE PHYSICIAN: Cintia Topete MD    HD: # 1    ASSESSMENT    Patient Active Problem List   Diagnosis    Closed fracture of spinous process of cervical vertebra, initial encounter ([facility] [address]    Fall   Syncopal fall at [facility], bradycardic A-fib not on [facility] due to frequent falls, hx of CABG, whipple 2022  C5 SP fx  Nasal bone fx bilateral    MEDICAL DECISION MAKING AND PLAN  NEURO:   -C5 fx  NS: no surgical intervention, treat in aspen collar, f/u clinic in 2 wks, signed off, CT flex ex ordered outpatient  MRI C spine: previously noted C5 fx, prevertebral soft tissue swelling C2-C5  -Discontinued tylenol  -Judy q6h PRN     2.   CV:  -A-fib with slow VR   -Cards: ECHO pending   -hold coreg, increase norvasc, started Cozaar   -consulted EP for pacemaker   -no AC due to fall risk  -SBPs: 147-164  -HR: 55-64  -MAP: 103-123  -Home meds:restarted, coreg held     3. HEME:              - Hgb: 11.2   - Plt: 123   - DVT prophylaxis: epc cuffs     4. RESP:              -on room air     5. GI  -Diet: regular diet       6. RENAL              -UOP: 1.3 L overnight              -IVF:LR 100cc/hr- discontinued              -BUN/Cr: 7/0.54               -Na/K: 139/3.9    -Mag 1.6      7. MSK:               -Activity status: activity as tolerated  -Weight bearing status: WB              -PT/OT     8. HEME/ ID  -Tmax: Afebrile  -WBC: 3.1 (4.2)  -Chemo yesterday for Pancreatic adenocarcinoma   -Decadronx2 days following chemo as prescribed     9. ENDO              -B  -Insulin: home insulin lantus 18u morning     10. Lines  - PIVx2, chemo port not accessed     11. PPX:  -DVT: epc cuff  -GI: glycolax daily     12. CONSULTS              -NS     13. DISPO:               -OK for SD         Chief Complaint: \"no complaints\"    SUBJECTIVE    Jessee Kong  did well overnight.  He is tolerating a regular diet. Regular Bms. Urinating. Denies any pain. No chest pain or sob. OBJECTIVE  VITALS: Temp: Temp: 98.6 °F (37 °C)Temp  Av.3 °F (36.8 °C)  Min: 97.7 °F (36.5 °C)  Max: 98.6 °F (37 °C) BP Systolic (13RSN), TIJ:130 , Min:111 , MYS:938   Diastolic (25ABN), QVH:36, Min:57, Max:121   Pulse Pulse  Av.8  Min: 51  Max: 81 Resp Resp  Av.9  Min: 14  Max: 35 Pulse ox SpO2  Av.7 %  Min: 78 %  Max: 100 %    CONSTITUTIONAL: appears well, no distress  HEENT: C-collar in place, PEARRL  LUNGS: clear to auscultation bilaterally  CV: RRR  GI: soft, non tender, no distention rigdity or guarding  MUSCULOSKELETAL: no extremity deformity  NEUROLOGIC: GCS 15, moving all four extremities, no sensory deficits or weakness  SKIN: warm and dry    Drain/tube output:    N/a    LAB:  CBC:   Recent Labs     10/21/22  1944 10/22/22  0524 10/23/22  0434   WBC 4.6 4.2 3.1*   HGB 11.5* 11.2* 11.2*   HCT 33.8* 32.6* 33.5*   MCV 88.7 88.6 89.8    111* 123*     BMP:   Recent Labs     10/21/22  1944 10/21/22  2207 10/22/22  0524 10/23/22  0434     --  134* 139   K 3.1*  --  3.8 3.9     --  102 104   CO2 22  --  23 28   BUN 10  --  8 7*   CREATININE 0.81  --  0.65* 0.54*   GLUCOSE 61* 120* 141* 190*       RADIOLOGY:  MRI CERVICAL SPINE WO CONTRAST   Final Result   Previously described C5 posterior element fracture is not as well delineated   on the current exam.      Prevertebral soft tissue swelling/fluid from C2-C5. Multilevel degenerative change with bilateral foraminal narrowing as   described above. CTA NECK W CONTRAST   Final Result   No evidence of dissection or acute injury in bilateral common carotid   arteries, bilateral internal carotid arteries and bilateral vertebral   arteries in the neck. In the soft tissues of the neck there is no acute process. Fracture involving the spinous process of C5 vertebra, redemonstrated.          CT THORACIC SPINE TRAUMA RECONSTRUCTION   Final Result   1. No acute lumbar spine or thoracic spine fracture. 2. Multilevel mild degenerative changes are seen. CT LUMBAR SPINE TRAUMA RECONSTRUCTION   Final Result   1. No acute lumbar spine or thoracic spine fracture. 2. Multilevel mild degenerative changes are seen. CT CHEST ABDOMEN PELVIS W CONTRAST Additional Contrast? None   Final Result   1. No thoracic or abdominal aortic aneurysm or dissection. 2. Atherosclerosis with coronary artery involvement and postoperative changes   from prior CABG. 3. Postoperative changes seen related to reported history of pancreatic   neoplasm, with a 19 mm left adrenal nodule which could represent adrenal   metastasis. Correlate with any previous outside CT imaging as no comparisons   are available within PACS for my review. 4. There is a small fluid collection seen adjacent to the pancreatic   resection site and hepaticojejunostomy, which could represent a small   postoperative seroma or lymphocele. No solid-appearing metastasis noted   elsewhere. 5. No acute process seen within the abdomen or pelvis. 6. Trace amount of ascites seen in the right lower quadrant of uncertain   significance. CT HEAD WO CONTRAST   Final Result   Head CT: No acute intracranial abnormality detected. Mildly impacted and angulated nasal bone fractures bilaterally. Cervical spine CT: Potentially unstable fracture involving the posterior   elements of C5. Findings were discussed with Min KABA at 11:12 pm on 10/21/2022. CT CERVICAL SPINE WO CONTRAST   Final Result   Head CT: No acute intracranial abnormality detected. Mildly impacted and angulated nasal bone fractures bilaterally. Cervical spine CT: Potentially unstable fracture involving the posterior   elements of C5. Findings were discussed with Min KABA at 11:12 pm on 10/21/2022.          XR CHEST PORTABLE   Final Result   Cardial pericardial silhouette enlargement. Differential considerations   include cardiomyopathy and pericardial effusion. No acute infiltrate identified.          XR CERVICAL SPINE FLEXION AND EXTENSION    (Results Pending)         Alysa Anna MD  10/23/22, 8:05 AM

## 2022-10-23 NOTE — PLAN OF CARE
Problem: Discharge Planning  Goal: Discharge to home or other facility with appropriate resources  10/23/2022 1851 by Louisa Hameed RN  Outcome: Progressing     Problem: Skin/Tissue Integrity  Goal: Absence of new skin breakdown  Description: 1. Monitor for areas of redness and/or skin breakdown  2. Assess vascular access sites hourly  3. Every 4-6 hours minimum:  Change oxygen saturation probe site  4. Every 4-6 hours:  If on nasal continuous positive airway pressure, respiratory therapy assess nares and determine need for appliance change or resting period.   10/23/2022 1851 by Louisa Hameed RN  Outcome: Progressing     Problem: Safety - Adult  Goal: Free from fall injury  10/23/2022 1851 by Louisa Hameed RN  Outcome: Progressing     Problem: ABCDS Injury Assessment  Goal: Absence of physical injury  10/23/2022 1851 by Louisa Hameed RN  Outcome: Progressing     Problem: Pain  Goal: Verbalizes/displays adequate comfort level or baseline comfort level  10/23/2022 1851 by Louisa Hameed RN  Outcome: Progressing     Problem: Nutrition Deficit:  Goal: Optimize nutritional status  Outcome: Progressing

## 2022-10-23 NOTE — PROGRESS NOTES
[facility]   Progress Note                   Date:   10/23/2022  Patient name: Fauzia Jones  Date of admission:  10/21/2022  7:31 PM  MRN:   [id number]  YOB: 1955  PCP: Luna Sands MD    Reason for Admission:     Subjective:       No acute issues overnight. Heart rate lowest has been 53. Blood pressure on the higher side. Asymptomatic. Medications:   Scheduled Meds:   sodium chloride flush  5-40 mL IntraVENous 2 times per day    polyethylene glycol  17 g Oral Daily    acetaminophen  1,000 mg Oral 3 times per day    lidocaine  20 mL IntraDERmal Once    atorvastatin  80 mg Oral Daily    [Held by provider] carvedilol  25 mg Oral BID    insulin glargine  18 Units SubCUTAneous QAM    amLODIPine  5 mg Oral Daily    dexamethasone  4 mg Oral Daily     Continuous Infusions:   sodium chloride      lactated ringers 100 mL/hr at 10/23/22 0642    dextrose       CBC:   Recent Labs     10/21/22  1944 10/22/22  0524 10/23/22  0434   WBC 4.6 4.2 3.1*   HGB 11.5* 11.2* 11.2*    111* 123*     BMP:    Recent Labs     10/21/22  1944 10/21/22  2207 10/22/22  0524 10/23/22  0434     --  134* 139   K 3.1*  --  3.8 3.9     --  102 104   CO2 22  --  23 28   BUN 10  --  8 7*   CREATININE 0.81  --  0.65* 0.54*   GLUCOSE 61* 120* 141* 190*     Hepatic:   Recent Labs     10/21/22  1944   AST 35   ALT 26   BILITOT 0.3   ALKPHOS 80       INR:   Recent Labs     10/21/22  1944   INR 1.1       Objective:   Vitals: BP (!) 157/109   Pulse 55   Temp 98.6 °F (37 °C) (Oral)   Resp 20   Ht 6' (1.829 m)   Wt 200 lb 2.8 oz (90.8 kg)   SpO2 (S) (!) 78%   BMI 27.15 kg/m²   General appearance: alert and cooperative with exam  HEENT: Head: Normocephalic, no lesions, without obvious abnormality.   Neck: no adenopathy, no carotid bruit, no JVD, supple, symmetrical, trachea midline and thyroid not enlarged, symmetric, no tenderness/mass/nodules  Lungs: clear to auscultation bilaterally  Heart: regular rate and rhythm, S1, S2 normal, no murmur, click, rub or gallop  Abdomen: soft, non-tender; bowel sounds normal; no masses,  no organomegaly  Extremities: extremities normal, atraumatic, no cyanosis or edema  Neurologic: Mental status: Alert, oriented, thought content appropriate       Patient Active Problem List   Diagnosis    Closed fracture of spinous process of cervical vertebra, initial encounter ([facility] [address]    Fall       DATA:    Diagnostics:    EKG: A. fib with RVR. Right bundle branch block. ECHO: Ordered     IMPRESSION:    Status post fall with dizziness and LOC- doesn't recall it. Patient was hypoglycemic on arrival with a blood glucose of 43. History of A. fib not on any AC secondary to thrombocytopenia in the past and falls  Reported bradycardia. History of CABG in 2005 as per patient. No records available. RECOMMENDATIONS:  Await echocardiogram rule out structural heart abnormality. Patient has been asymptomatic and vitally stable with bradycardia in the low 50s. Hold Coreg and will continue to monitor on telemetry. Increase Norvasc. And add Cozaar  Consulted EP. Will follow echocardiogram.  Will follow. Ceci Nichole MD       Cardiovascular Fellow  10/23/2022, 7:39 AM      Attending Physician Statement  I have discussed the case of Nadine Wood including pertinent history and exam findings with the resident. I have seen and examined the patient and the key elements of the encounter have been performed by me. I agree with the assessment, plan and orders as documented by the resident With changes made to the note.      Electronically signed by Neha Hyde MD on 10/23/2022 at 2:21 PM.    [facility]      [phone]

## 2022-10-23 NOTE — PROGRESS NOTES
Comprehensive Nutrition Assessment    Type and Reason for Visit:  Initial, Positive Nutrition Screen (Weight Loss)    Nutrition Recommendations/Plan:   Continue current diet. Encourage/monitor PO intakes as tolerated. Will continue to provide Ensure ONS (in vanilla or strawberry) per pt request.  Monitor labs, weights, and plan of care. Malnutrition Assessment:  Malnutrition Status: At risk for malnutrition (Comment) (10/23/22 1413)    Context:  Acute Illness     Findings of the 6 clinical characteristics of malnutrition:  Energy Intake:  75% or less of estimated energy requirements for 7 or more days  Weight Loss:  Unable to assess - Reported h/o weight loss. Body Fat Loss:  No significant body fat loss   Muscle Mass Loss:  Mild muscle mass loss Clavicles (pectoralis & deltoids)  Fluid Accumulation:  Mild Extremities   Strength:  Not Performed    Nutrition Assessment:    Pt admitted s/p fall with C5 fracture and bilateral nasal bone fracture. PMH: CABG and stage III pancreatic cancer on chemotherapy. Pt reports his appetite has been pretty good during admission and he has been eating well at his meals. Pt reports the Ensure supplements are okay and he prefers the strawberry and vanilla flavors. Pt reports  lb - states he started to lose weight after he was diagnosed with cancer but he was also trying to lose weight. States his weight has been stable at 190-200 lb. Labs reviewed: Glucose 151-308 mg/dL. Meds reviewed. Nutrition Related Findings:    labs/meds reviewed. last BM 10/23. Wound Type:  (Traumatic wound to nose)       Current Nutrition Intake & Therapies:    Average Meal Intake: %  Average Supplements Intake: %  ADULT DIET;  Regular  ADULT ORAL NUTRITION SUPPLEMENT; Breakfast, Lunch, Dinner; Standard High Calorie/High Protein Oral Supplement    Anthropometric Measures:  Height: 6' (182.9 cm)  Ideal Body Weight (IBW): 178 lbs (81 kg)    Admission Body Weight: 200 lb 2.8 oz (90.8 kg)  Current Body Weight: 200 lb 2.8 oz (90.8 kg), 112.5 % IBW. Weight Source: Bed Scale  Current BMI (kg/m2): 27.1  Usual Body Weight:  (190-200 lb per pt - previous  lb)                       BMI Categories: Overweight (BMI 25.0-29. 9)    Estimated Daily Nutrient Needs:  Energy Requirements Based On: Kcal/kg  Weight Used for Energy Requirements: Admission  Energy (kcal/day): 2237-2624 kcals/day  Weight Used for Protein Requirements: Admission  Protein (g/day): 105-130 gm pro/day  Method Used for Fluid Requirements: ml/Kg  Fluid (ml/day): 25 mL/kg = 2300 mL/day or per MD    Nutrition Diagnosis:   Inadequate oral intake related to catabolic illness (appetite) as evidenced by poor intake prior to admission, weight loss (h/o variable PO intakes; need for ONS)    Nutrition Interventions:   Food and/or Nutrient Delivery: Continue Current Diet, Continue Oral Nutrition Supplement  Nutrition Education/Counseling: No recommendation at this time  Coordination of Nutrition Care: Continue to monitor while inpatient  Plan of Care discussed with: Patient, Wife    Goals:  Previous Goal Met:  (Goal Set)  Goals: Meet at least 75% of estimated needs, prior to discharge       Nutrition Monitoring and Evaluation:   Behavioral-Environmental Outcomes: None Identified  Food/Nutrient Intake Outcomes: Food and Nutrient Intake, Supplement Intake  Physical Signs/Symptoms Outcomes: Biochemical Data, GI Status, Fluid Status or Edema, Nutrition Focused Physical Findings, Skin, Weight    Discharge Planning:    Continue current diet, Continue Oral Nutrition Supplement     Marlys Mendoza RD, LD  Contact: [phone]

## 2022-10-24 LAB
ABSOLUTE EOS #: 0.05 K/UL (ref 0–0.44)
ABSOLUTE IMMATURE GRANULOCYTE: <0.03 K/UL (ref 0–0.3)
ABSOLUTE LYMPH #: 1.67 K/UL (ref 1.1–3.7)
ABSOLUTE MONO #: 0.15 K/UL (ref 0.1–1.2)
ANION GAP SERPL CALCULATED.3IONS-SCNC: 7 MMOL/L (ref 9–17)
BASOPHILS # BLD: 0 % (ref 0–2)
BASOPHILS ABSOLUTE: <0.03 K/UL (ref 0–0.2)
BUN BLDV-MCNC: 8 MG/DL (ref 8–23)
CALCIUM SERPL-MCNC: 8 MG/DL (ref 8.6–10.4)
CHLORIDE BLD-SCNC: 100 MMOL/L (ref 98–107)
CO2: 28 MMOL/L (ref 20–31)
CREAT SERPL-MCNC: 0.53 MG/DL (ref 0.7–1.2)
EKG ATRIAL RATE: 56 BPM
EKG ATRIAL RATE: 60 BPM
EKG Q-T INTERVAL: 492 MS
EKG Q-T INTERVAL: 542 MS
EKG QRS DURATION: 154 MS
EKG QRS DURATION: 154 MS
EKG QTC CALCULATION (BAZETT): 474 MS
EKG QTC CALCULATION (BAZETT): 504 MS
EKG R AXIS: 57 DEGREES
EKG R AXIS: 63 DEGREES
EKG T AXIS: 31 DEGREES
EKG T AXIS: 33 DEGREES
EKG VENTRICULAR RATE: 52 BPM
EKG VENTRICULAR RATE: 56 BPM
EOSINOPHILS RELATIVE PERCENT: 1 % (ref 1–4)
GFR SERPL CREATININE-BSD FRML MDRD: >60 ML/MIN/1.73M2
GLUCOSE BLD-MCNC: 112 MG/DL (ref 75–110)
GLUCOSE BLD-MCNC: 133 MG/DL (ref 75–110)
GLUCOSE BLD-MCNC: 149 MG/DL (ref 75–110)
GLUCOSE BLD-MCNC: 149 MG/DL (ref 75–110)
GLUCOSE BLD-MCNC: 158 MG/DL (ref 70–99)
HCT VFR BLD CALC: 32.1 % (ref 40.7–50.3)
HEMOGLOBIN: 11.2 G/DL (ref 13–17)
IMMATURE GRANULOCYTES: 0 %
LV EF: 67 %
LVEF MODALITY: NORMAL
LYMPHOCYTES # BLD: 50 % (ref 24–43)
MAGNESIUM: 1.8 MG/DL (ref 1.6–2.6)
MCH RBC QN AUTO: 30.6 PG (ref 25.2–33.5)
MCHC RBC AUTO-ENTMCNC: 34.9 G/DL (ref 28.4–34.8)
MCV RBC AUTO: 87.7 FL (ref 82.6–102.9)
MONOCYTES # BLD: 4 % (ref 3–12)
NRBC AUTOMATED: 0 PER 100 WBC
PDW BLD-RTO: 14.3 % (ref 11.8–14.4)
PHOSPHORUS: 2.7 MG/DL (ref 2.5–4.5)
PLATELET # BLD: 126 K/UL (ref 138–453)
PMV BLD AUTO: 9.2 FL (ref 8.1–13.5)
POTASSIUM SERPL-SCNC: 3.3 MMOL/L (ref 3.7–5.3)
RBC # BLD: 3.66 M/UL (ref 4.21–5.77)
SEG NEUTROPHILS: 45 % (ref 36–65)
SEGMENTED NEUTROPHILS ABSOLUTE COUNT: 1.56 K/UL (ref 1.5–8.1)
SODIUM BLD-SCNC: 135 MMOL/L (ref 135–144)
WBC # BLD: 3.5 K/UL (ref 3.5–11.3)

## 2022-10-24 PROCEDURE — 93306 TTE W/DOPPLER COMPLETE: CPT

## 2022-10-24 PROCEDURE — 6370000000 HC RX 637 (ALT 250 FOR IP): Performed by: STUDENT IN AN ORGANIZED HEALTH CARE EDUCATION/TRAINING PROGRAM

## 2022-10-24 PROCEDURE — 93010 ELECTROCARDIOGRAM REPORT: CPT | Performed by: INTERNAL MEDICINE

## 2022-10-24 PROCEDURE — 83735 ASSAY OF MAGNESIUM: CPT

## 2022-10-24 PROCEDURE — 6360000002 HC RX W HCPCS: Performed by: STUDENT IN AN ORGANIZED HEALTH CARE EDUCATION/TRAINING PROGRAM

## 2022-10-24 PROCEDURE — 6370000000 HC RX 637 (ALT 250 FOR IP): Performed by: INTERNAL MEDICINE

## 2022-10-24 PROCEDURE — 84100 ASSAY OF PHOSPHORUS: CPT

## 2022-10-24 PROCEDURE — 82947 ASSAY GLUCOSE BLOOD QUANT: CPT

## 2022-10-24 PROCEDURE — 80048 BASIC METABOLIC PNL TOTAL CA: CPT

## 2022-10-24 PROCEDURE — 85025 COMPLETE CBC W/AUTO DIFF WBC: CPT

## 2022-10-24 PROCEDURE — 2580000003 HC RX 258: Performed by: STUDENT IN AN ORGANIZED HEALTH CARE EDUCATION/TRAINING PROGRAM

## 2022-10-24 PROCEDURE — 36415 COLL VENOUS BLD VENIPUNCTURE: CPT

## 2022-10-24 PROCEDURE — 2060000000 HC ICU INTERMEDIATE R&B

## 2022-10-24 RX ORDER — POTASSIUM CHLORIDE 20 MEQ/1
40 TABLET, EXTENDED RELEASE ORAL EVERY 4 HOURS
Status: COMPLETED | OUTPATIENT
Start: 2022-10-24 | End: 2022-10-24

## 2022-10-24 RX ORDER — FLUDROCORTISONE ACETATE 0.1 MG/1
0.1 TABLET ORAL DAILY
Status: DISCONTINUED | OUTPATIENT
Start: 2022-10-24 | End: 2022-10-25 | Stop reason: HOSPADM

## 2022-10-24 RX ORDER — MAGNESIUM SULFATE IN WATER 40 MG/ML
2000 INJECTION, SOLUTION INTRAVENOUS ONCE
Status: COMPLETED | OUTPATIENT
Start: 2022-10-24 | End: 2022-10-24

## 2022-10-24 RX ORDER — ENOXAPARIN SODIUM 100 MG/ML
30 INJECTION SUBCUTANEOUS 2 TIMES DAILY
Status: DISCONTINUED | OUTPATIENT
Start: 2022-10-24 | End: 2022-10-25 | Stop reason: HOSPADM

## 2022-10-24 RX ADMIN — POTASSIUM CHLORIDE 40 MEQ: 1500 TABLET, EXTENDED RELEASE ORAL at 09:06

## 2022-10-24 RX ADMIN — SODIUM CHLORIDE: 9 INJECTION, SOLUTION INTRAVENOUS at 08:55

## 2022-10-24 RX ADMIN — ENOXAPARIN SODIUM 30 MG: 100 INJECTION SUBCUTANEOUS at 11:37

## 2022-10-24 RX ADMIN — POTASSIUM CHLORIDE 40 MEQ: 1500 TABLET, EXTENDED RELEASE ORAL at 11:39

## 2022-10-24 RX ADMIN — SODIUM CHLORIDE, PRESERVATIVE FREE 10 ML: 5 INJECTION INTRAVENOUS at 09:13

## 2022-10-24 RX ADMIN — INSULIN GLARGINE 18 UNITS: 100 INJECTION, SOLUTION SUBCUTANEOUS at 09:35

## 2022-10-24 RX ADMIN — ATORVASTATIN CALCIUM 80 MG: 80 TABLET, FILM COATED ORAL at 09:12

## 2022-10-24 RX ADMIN — MAGNESIUM SULFATE HEPTAHYDRATE 2000 MG: 40 INJECTION, SOLUTION INTRAVENOUS at 09:06

## 2022-10-24 RX ADMIN — FLUDROCORTISONE ACETATE 0.1 MG: 0.1 TABLET ORAL at 16:53

## 2022-10-24 RX ADMIN — AMLODIPINE BESYLATE 10 MG: 10 TABLET ORAL at 09:11

## 2022-10-24 RX ADMIN — LOSARTAN POTASSIUM 50 MG: 50 TABLET, FILM COATED ORAL at 09:12

## 2022-10-24 RX ADMIN — SODIUM CHLORIDE, PRESERVATIVE FREE 10 ML: 5 INJECTION INTRAVENOUS at 20:19

## 2022-10-24 NOTE — PROGRESS NOTES
Physical Therapy        Physical Therapy Cancel Note      DATE: 10/24/2022    NAME: Roxie Aguirre  MRN: [id number]   : 1955      Patient not seen this date for Physical Therapy due to:    Patient Declined: pt adamantly refused therapy evaluation despite education and encouragement on discharge planning and importance of mobility, pt yelling \"I am not doing therapy at this hospital! I can stand and walk! \" Pt proceeds to obtain standing position in room with steady gait with poor acknowledgment of lines despite verbal cueing for safety awareness, then proceeds to refuse further mobility and obtains seated position in bedside chair. Pt's wife politely requested education from writer and OT at bedside in regards to c-collar. Education provided to wife in regards to donning/ doffing c-collar upon discharge. PT will check back 10/25/22 if patient becomes willing to participate.        Electronically signed by Troy Ahumada PT on 10/24/2022 at 2:43 PM

## 2022-10-24 NOTE — PROGRESS NOTES
Occupational [address]  Occupational Therapy Not Seen Note    DATE: 10/24/2022    NAME: Dejuan Costa  MRN: [id number]   : 1955      Patient not seen this date for Occupational Therapy due to:    Patient Declined: Pt adamantly refused to participate in occupational therapy evaluation despite max encouragement and education on purpose. Pt demonstrated significant agitation, impulsively standing up and rotating around to demonstrate independence with lack of awareness to all lines. Pt quickly sat in recliner stating \"I will not work with physical therapy at this hospital.\" Wife attempted to encourage pt to participate, expressing concerns about being the primary person to help manage the c-collar upon discharge. Wife educated on c-collar management and precautions.      Next Scheduled Treatment: Check back 10/25/2022     Electronically signed by Goran Collado OT on 10/24/2022 at 4:25 PM

## 2022-10-24 NOTE — CARE COORDINATION
Transitional planning    Writer to room to discuss d/c needs and plan, plan is to return home, voiced no needs and will have transportation.

## 2022-10-24 NOTE — DISCHARGE INSTR - COC
Continuity of Care Form    Patient Name: Lydia Trejo   :  1955  MRN:  [id number]    Admit date:  10/21/2022  Discharge date:  ***    Code Status Order: Full Code   Advance Directives:     Admitting Physician:  Stacy Gupta MD  PCP: Sonu Musa MD    Discharging Nurse: [facility] Unit/Room#: 5151/2236-15  Discharging Unit Phone Number: ***    Emergency Contact:   Extended Emergency Contact Information  Primary Emergency Contact: Lucero David  Home Phone: [phone]  Relation: Spouse    Past Surgical History:  No past surgical history on file. Immunization History:   Immunization History   Administered Date(s) Administered    Tdap (Boostrix, Adacel) 10/22/2022       Active Problems:  Patient Active Problem List   Diagnosis Code    Closed [facility] spinous process of cervical [facility] S12. 9XXA    Fall W19. Roxanna Lively       Isolation/Infection:   Isolation            No Isolation          Patient Infection Status       None to display            Nurse Assessment:  Last Vital Signs: BP (!) 144/85   Pulse 60   Temp 99 °F (37.2 °C) (Temporal)   Resp 15   Ht 6' (1.829 m)   Wt 200 lb 9.9 oz (91 kg)   SpO2 98%   BMI 27.21 kg/m²     Last documented pain score (0-10 scale): Pain Level:  (no)  Last Weight:   Wt Readings from Last 1 Encounters:   10/24/22 200 lb 9.9 oz (91 kg)     Mental Status:  {IP PT MENTAL STATUS:}    IV Access:  { SEGUNDO IV ACCESS:732422071}    Nursing Mobility/ADLs:  Walking   {P DME HUWC:618681442}  Transfer  {P DME IHFO:664274885}  Bathing  {P DME KTSB:335662275}  Dressing  {P DME ISDH:211076121}  Toileting  {P DME SBA}  Feeding  {P DME JEEO:910559902}  Med Admin  {P DME ZGNS:195964364}  Med Delivery   { SEGUNDO MED Delivery:903352178}    Wound Care Documentation and Therapy:  Wound 10/22/22 Nose (Active)   Wound Etiology Traumatic 10/23/22 2300   Dressing Status Intact; Old drainage noted 10/23/22    Wound Cleansed Not Cleansed 10/23/22 2300   Dressing/Treatment Open to air; Pharmaceutical agent (see MAR) 10/23/22 2300   Wound Assessment Erythema 10/23/22 2300   Drainage Amount None 10/23/22 2300   Odor None 10/23/22 2000   Kayy-wound Assessment Edematous; Intact 10/23/22 2300   Number of days: 2        Elimination:  Continence: Bowel: {YES / HA:56529}  Bladder: {YES / PC:67932}  Urinary Catheter: {Urinary Catheter:495848490}   Colostomy/Ileostomy/Ileal Conduit: {YES / XE:88579}       Date of Last BM: ***    Intake/Output Summary (Last 24 hours) at 10/24/2022 0849  Last data filed at 10/24/2022 0729  Gross per 24 hour   Intake 2688.89 ml   Output 4265 ml   Net -1576.11 ml     I/O last 3 completed shifts: In: 4063.1 [P.O.:2232;  I.V.:1831.1]  Out: 8070 [Urine:5495]    Safety Concerns:     [address] [employer] Safety Concerns:402272801}    Impairments/Disabilities:      [address] [employer] Impairments/Disabilities:661641376}    Nutrition Therapy:  Current Nutrition Therapy:   [address] [employer] Diet List:941822923}    Routes of Feeding: {CHP DME Other Feedings:927825759}  Liquids: {Slp liquid thickness:33960}  Daily Fluid Restriction: {CHP DME Yes amt example:924465511}  Last Modified Barium Swallow with Video (Video Swallowing Test): {Done Not Done NSAL:906404395}    Treatments at the Time of Hospital Discharge:   Respiratory Treatments: ***  Oxygen Therapy:  {Therapy; copd oxygen:54682}  Ventilator:    { CC Vent ZTGM:372845499}    Rehab Therapies: {THERAPEUTIC INTERVENTION:8378053962}  Weight Bearing Status/Restrictions: [facility] [employer] [facility]  Weight Bearin}  Other Medical Equipment (for information only, NOT a DME order):  {EQUIPMENT:350657633}  Other Treatments: ***    Patient's personal belongings (please select all that are sent with patient):  {CHP DME Belongings:843963382}    RN SIGNATURE:  {Esignature:229798436}    CASE MANAGEMENT/SOCIAL WORK SECTION    Inpatient Status Date: ***    Readmission Risk Assessment Score:  Readmission Risk Risk of Unplanned Readmission:  14           Discharging to Facility/ Agency   Name:   Address:  Phone:  Fax:    Dialysis Facility (if applicable)   Name:  Address:  Dialysis Schedule:  Phone:  Fax:    / signature: {Esignature:343693480}    PHYSICIAN SECTION    Prognosis: Good    Condition at Discharge: Stable    Rehab Potential (if transferring to Rehab): Good    Recommended Labs or Other Treatments After Discharge:     Physician Certification: I certify the above information and transfer of Diana Zuniga  is necessary for the continuing treatment of the diagnosis listed and that he requires Home Care for less 30 days.      Update Admission H&P: No change in H&P    PHYSICIAN SIGNATURE:  Electronically signed by DEDRICK Minor CNP on 10/24/22 at 10:12 AM EDT

## 2022-10-24 NOTE — PROGRESS NOTES
PROGRESS NOTE    PATIENT NAME: Kelly Narayanan RECORD NO. [id number]  DATE: 10/24/2022    PRIMARY CARE PHYSICIAN: Marlen Rushing MD    HD: # 2    ASSESSMENT    Patient Active Problem List   Diagnosis    Closed fracture of spinous process of cervical vertebra, initial encounter ([facility]    Fall   Syncopal fall at [facility], bradycardic A-fib not on [facility] due to frequent falls, hx of CABG, whipple 2022  C5 SP fx  Nasal bone fx bilateral    MEDICAL DECISION MAKING AND PLAN  NEURO:   -C5 fx  NS: no surgical intervention, treat in aspen collar, f/u clinic in 2 wks, signed off, CT flex ex ordered outpatient  MRI C spine: previously noted C5 fx, prevertebral soft tissue swelling C2-C5  -Discontinued tylenol  -Judy q6h PRN     2.   CV:  -A-fib with slow VR   -Cards: ECHO pending   -hold coreg, increase norvasc, started Cozaar   -consulted EP for pacemaker   -no AC due to fall risk  -Home meds:restarted, coreg     3. GI  -Diet: regular diet  -Status post Whipple recently       4. HEME/ ID  -Tmax: Afebrile  -WBC: 3.5  -Chemo yesterday for Pancreatic adenocarcinoma   -Decadronx2 days following chemo as prescribed     5. ENDO              -B  -Insulin: home insulin lantus 18u morning     6. PPX:  -DVT: Lovenox     7. DISPO:               -PT/OT   -Follow-up final recommendations from cardiology and electrophysiology        Chief Complaint: \"no complaints\"    SUBJECTIVE    Frank Garrettleighton examined at bedside this morning, no acute events overnight. Overall doing well. Patient and family wish to speak with neurosurgery to get more in-depth recommendations. Pain diet without nausea or emesis. Voiding.     OBJECTIVE  VITALS: Temp: Temp: 99 °F (37.2 °C)Temp  Av.4 °F (36.9 °C)  Min: 97.9 °F (36.6 °C)  Max: 99 °F (39.0 °C) BP Systolic (44SLS), PYB:225 , Min:130 , OWF:336   Diastolic (31IYO), SUN:26, Min:69, Max:117   Pulse Pulse  Av.3  Min: 54  Max: 87 Resp Resp  Av.3  Min: 12  Max: 24 Pulse ox SpO2  Av.6 %  Min: 93 %  Max: 98 %    CONSTITUTIONAL: appears well, no distress  HEENT: C-collar in place, PEARRL  LUNGS: clear to auscultation bilaterally  CV: RRR  GI: soft, non tender, no distention rigdity or guarding  MUSCULOSKELETAL: no extremity deformity  NEUROLOGIC: GCS 15, moving all four extremities, no sensory deficits or weakness  SKIN: warm and dry    Drain/tube output:    N/a    LAB:  CBC:   Recent Labs     10/22/22  0524 10/23/22  0434 10/24/22  0556   WBC 4.2 3.1* 3.5   HGB 11.2* 11.2* 11.2*   HCT 32.6* 33.5* 32.1*   MCV 88.6 89.8 87.7   * 123* 126*       BMP:   Recent Labs     10/22/22  0524 10/23/22  0434 10/24/22  0556   * 139 135   K 3.8 3.9 3.3*    104 100   CO2 23 28 28   BUN 8 7* 8   CREATININE 0.65* 0.54* 0.53*   GLUCOSE 141* 190* 158*         RADIOLOGY:  MRI CERVICAL SPINE WO CONTRAST   Final Result   Previously described C5 posterior element fracture is not as well delineated   on the current exam.      Prevertebral soft tissue swelling/fluid from C2-C5. Multilevel degenerative change with bilateral foraminal narrowing as   described above. CTA NECK W CONTRAST   Final Result   No evidence of dissection or acute injury in bilateral common carotid   arteries, bilateral internal carotid arteries and bilateral vertebral   arteries in the neck. In the soft tissues of the neck there is no acute process. Fracture involving the spinous process of C5 vertebra, redemonstrated. CT THORACIC SPINE TRAUMA RECONSTRUCTION   Final Result   1. No acute lumbar spine or thoracic spine fracture. 2. Multilevel mild degenerative changes are seen. CT LUMBAR SPINE TRAUMA RECONSTRUCTION   Final Result   1. No acute lumbar spine or thoracic spine fracture. 2. Multilevel mild degenerative changes are seen. CT CHEST ABDOMEN PELVIS W CONTRAST Additional Contrast? None   Final Result   1. No thoracic or abdominal aortic aneurysm or dissection. 2. Atherosclerosis with coronary artery involvement and postoperative changes   from prior CABG. 3. Postoperative changes seen related to reported history of pancreatic   neoplasm, with a 19 mm left adrenal nodule which could represent adrenal   metastasis. Correlate with any previous outside CT imaging as no comparisons   are available within PACS for my review. 4. There is a small fluid collection seen adjacent to the pancreatic   resection site and hepaticojejunostomy, which could represent a small   postoperative seroma or lymphocele. No solid-appearing metastasis noted   elsewhere. 5. No acute process seen within the abdomen or pelvis. 6. Trace amount of ascites seen in the right lower quadrant of uncertain   significance. CT HEAD WO CONTRAST   Final Result   Head CT: No acute intracranial abnormality detected. Mildly impacted and angulated nasal bone fractures bilaterally. Cervical spine CT: Potentially unstable fracture involving the posterior   elements of C5. Findings were discussed with Harshad KABA at 11:12 pm on 10/21/2022. CT CERVICAL SPINE WO CONTRAST   Final Result   Head CT: No acute intracranial abnormality detected. Mildly impacted and angulated nasal bone fractures bilaterally. Cervical spine CT: Potentially unstable fracture involving the posterior   elements of C5. Findings were discussed with Harshad KABA at 11:12 pm on 10/21/2022. XR CHEST PORTABLE   Final Result   Cardial pericardial silhouette enlargement. Differential considerations   include cardiomyopathy and pericardial effusion. No acute infiltrate identified.          XR CERVICAL SPINE FLEXION AND EXTENSION    (Results Pending)         Katlin Whiting DO  10/23/22, 11:26 AM

## 2022-10-24 NOTE — CONSULTS
[facility]  Inpatient Cardiology Consult             Date:   10/24/2022  Patient name: Lydia Trejo  Date of admission:  10/21/2022  7:31 PM  MRN:   [id number]  YOB: 1955      Reason for Admission:  Syncope    CHIEF COMPLAINT:  Passed out at the casino     History Obtained From:  Patient and medical record    HISTORY OF PRESENT ILLNESS:    The patient is a 78 y.o. gentleman with h/o HTN, HLP, CAD, CABG 2005 and pancreatic cancer with recent Whipple procedure, admitted 10/21 with C5 spine fracture and bilateral nasal bone fracture following a syncopal episode. He was visiting [facility] from his home in [place]. Patient initially presented with fall from standing height, stated that he was at a casino table when he felt dizzy and lightheaded and fell forward, he states that he fell on the front of his face but denies any loss of consciousness. He was initially bradycardic with HR 45-50 bpm, with sinus rates gradually increasing following admission. Patient reportedly does have a history of PAF however he has not been on AC likely secondary to multiple falls however no records available in EMR. Reportedly when he was brought to the ER he was also found to be hypoglycemic with blood glucose in the 40s. Patient does have a history of coronary artery bypass surgery in 2005 but no records available. Past Medical History:  HTN, HLP, CAD, s/p CABG 2005     Past Surgical History:   has no past surgical history on file. Home Medications:    Prior to Admission medications    Medication Sig Start Date End Date Taking?  Authorizing Provider   candesartan (ATACAND) 32 MG tablet Take 32 mg by mouth daily   Yes Historical Provider, MD   atorvastatin (LIPITOR) 80 MG tablet Take 80 mg by mouth daily   Yes Historical Provider, MD   carvedilol (COREG) 25 MG tablet Take 25 mg by mouth 2 times daily   Yes Historical Provider, MD   metFORMIN (GLUCOPHAGE) 1000 MG tablet Take 1,000 mg by mouth 2 times daily (with meals)   Yes Historical Provider, MD   amLODIPine (NORVASC) 2.5 MG tablet Take 2.5 mg by mouth daily   Yes Historical Provider, MD   dapagliflozin (FARXIGA) 5 MG tablet Take 5 mg by mouth daily   Yes Historical Provider, MD   insulin glargine (LANTUS SOLOSTAR) 100 UNIT/ML injection pen Inject 18 Units into the skin every morning   Yes Historical Provider, MD   dexamethasone (DECADRON) 2 MG tablet Take 4 mg by mouth See Admin Instructions Daily for 2 days after chemo. Yes Historical Provider, MD   POTASSIUM CHLORIDE PO Take 20 mEq by mouth daily   Yes Historical Provider, MD       Allergies:  Patient has no known allergies. Social History:        Family History:   Positive for early CAD    REVIEW OF SYSTEMS:    Constitutional: there has been no unanticipated weight loss. There's been No change in energy level, No change in activity level. Eyes: No visual changes or diplopia. No scleral icterus. ENT: No Headaches, hearing loss or vertigo. No mouth sores or sore throat. Cardiovascular: No problem  Respiratory: No previous reported problems  Gastrointestinal: No abdominal pain, appetite loss, blood in stools. No change in bowel or bladder habits. Genitourinary: No dysuria, trouble voiding, or hematuria. Musculoskeletal:  No gait disturbance, No weakness or joint complaints. Integumentary: No rash or pruritis. Neurological: No headache, diplopia, change in muscle strength, numbness or tingling. No change in gait, balance, coordination, mood, affect, memory, mentation, behavior. Psychiatric: No anxiety, or depression. Endocrine: No temperature intolerance. No excessive thirst, fluid intake, or urination. No tremor. Hematologic/Lymphatic: No abnormal bruising or bleeding, blood clots or swollen lymph nodes. Allergic/Immunologic: No nasal congestion or hives.     PHYSICAL EXAM:    Physical Examination:    BP (!) 154/96   Pulse 68   Temp 98 °F (36.7 °C) (Temporal)   Resp 13   Ht 6' (1.829 m)   Wt 200 lb 9.9 oz (91 kg)   SpO2 99%   BMI 27.21 kg/m²    Constitutional and General Appearance: alert, cooperative, no distress and appears stated age  [de-identified]: PERRL, no cervical lymphadenopathy. No masses palpable. Normal oral mucosa  Respiratory:  Normal excursion and expansion without use of accessory muscles  Resp Auscultation: Good respiratory effort. No for increased work of breathing. On auscultation: clear to auscultation bilaterally  Cardiovascular: The apical impulse is not displaced  Heart tones are crisp and normal. regular S1 and S2. Murmurs: None  Jugular venous pulsation Normal  The carotid upstroke is normal in amplitude and contour without delay or bruit  Peripheral pulses are symmetrical and full   Abdomen:  No masses or tenderness  Bowel sounds present  Extremities:   No Cyanosis or Clubbing   Lower extremity edema: None   Skin: Warm and dry  Neurological:  Alert and oriented. Moves all extremities well  No abnormalities of mood, affect, memory, mentation, or behavior are noted    DATA:    Diagnostics:      EKG: Sinus bradycardia, 56 bpm; PAC's; RBBB    2D ECHO ( 10/24/22)  FINDINGS  Left Atrium  Left atrium is mildly dilated. Left Ventricle  Left ventricle is normal in size. Global left ventricular systolic function  is normal. Calculated ejection fraction 67% by Carter's method. Right Atrium  Right atrium is normal in size. Right Ventricle  Normal right ventricular size and function. TAPSE value of 1.2cm noted. Mitral Valve  Redundant mitral valve chords. No mitral regurgitation. Aortic Valve  Aortic valve is trileaflet and opens adequately. No aortic insufficiency. Tricuspid Valve  Normal tricuspid valve structure and function. No tricuspid regurgitation. Pulmonic Valve  The pulmonic valve is normal in structure. No pulmonic insufficiency. Pericardial Effusion  No pericardial effusion seen.     Labs:     CBC:   Recent Labs     10/23/22  0434 10/24/22  0556   WBC 3. 1* 3.5   HGB 11.2* 11.2*   HCT 33.5* 32.1*   * 126*     BMP:   Recent Labs     10/23/22  0434 10/24/22  0556    135   K 3.9 3.3*   CO2 28 28   BUN 7* 8   CREATININE 0.54* 0.53*   LABGLOM >60 >60   GLUCOSE 190* 158*     BNP: No results for input(s): BNP in the last 72 hours. PT/INR:   Recent Labs     10/21/22  1944   PROTIME 11.7   INR 1.1     APTT:No results for input(s): APTT in the last 72 hours. CARDIAC ENZYMES:No results for input(s): CKTOTAL, CKMB, CKMBINDEX, TROPONINI in the last 72 hours. FASTING LIPID PANEL:No results found for: HDL, LDLDIRECT, LDLCALC, TRIG  LIVER PROFILE:  Recent Labs     10/21/22  1944   AST 35   ALT 26   LABALBU 2.9*         IMPRESSION:     Syncope and fall with likely vasovagal mechanism   Post-traumatic C5 spinous process fracture   Sinus bradycardia post-syncope, improved and likely due to vagal effects   PAF; currently maintaining sinus rhythm   Stable CAD; h/o CABG 2005 with normal LVEF on echo today   Essential HTN   HLP   Hypoglycemia   Pancreatic cancer    Patient Active Problem List   Diagnosis    Closed fracture of spinous process of cervical vertebra, initial encounter ([facility]    Fall       RECOMMENDATIONS:  Start fludrocortisone 0.1 mg po daily  Carvedilol discontinued with amlodipine increased and losartan added. No indications for permanent pacing at the present time. Pt was instructed to follow closely after discharge with his PCP and cardiologist in [facility]. Discussed with patient and nursing.     Electronically signed by Nadya Joshi MD on 10/24/2022 at 3:20 PM.  [facility] cardiology Consultant

## 2022-10-24 NOTE — PROGRESS NOTES
9143 Notified Dr. Carlotta Stovall that patient's HR dropped down to 48 bpm then came back up to mid to high 50's bpm. No new orders at this time.

## 2022-10-24 NOTE — PLAN OF CARE
Problem: Discharge Planning  Goal: Discharge to home or other facility with appropriate resources  10/24/2022 0005 by Estella Terrell RN  Outcome: Progressing     Problem: Skin/Tissue Integrity  Goal: Absence of new skin breakdown  Description: 1. Monitor for areas of redness and/or skin breakdown  2. Assess vascular access sites hourly  3. Every 4-6 hours minimum:  Change oxygen saturation probe site  4. Every 4-6 hours:  If on nasal continuous positive airway pressure, respiratory therapy assess nares and determine need for appliance change or resting period.   10/24/2022 0005 by Estella Terrell RN  Outcome: Progressing     Problem: Safety - Adult  Goal: Free from fall injury  10/24/2022 0005 by Estella Terrell RN  Outcome: Progressing     Problem: ABCDS Injury Assessment  Goal: Absence of physical injury  10/24/2022 0005 by Estella Terrell RN  Outcome: Progressing     Problem: Pain  Goal: Verbalizes/displays adequate comfort level or baseline comfort level  10/24/2022 0005 by Estella Terrell RN  Outcome: Progressing     Problem: Nutrition Deficit:  Goal: Optimize nutritional status  10/24/2022 0005 by Estella Terrell RN  Outcome: Progressing

## 2022-10-24 NOTE — PLAN OF CARE
Problem: Discharge Planning  Goal: Discharge to home or other facility with appropriate resources  10/24/2022 1233 by Luz Sarmiento RN  Flowsheets (Taken 10/24/2022 1233)  Discharge to home or other facility with appropriate resources: Arrange for needed discharge resources and transportation as appropriate  Note: Records number and form provided  10/24/2022 0005 by Connie Mendosa RN  Outcome: Progressing     Problem: Skin/Tissue Integrity  Goal: Absence of new skin breakdown  Description: 1. Monitor for areas of redness and/or skin breakdown  2. Assess vascular access sites hourly  3. Every 4-6 hours minimum:  Change oxygen saturation probe site  4. Every 4-6 hours:  If on nasal continuous positive airway pressure, respiratory therapy assess nares and determine need for appliance change or resting period.   10/24/2022 1233 by Luz Sarmiento RN  Note: Dependent areas inspected for skin breakdown   10/24/2022 0005 by Connie Mendosa RN  Outcome: Progressing     Problem: Safety - Adult  Goal: Free from fall injury  10/24/2022 1233 by Luz Sarmiento RN  Outcome: Progressing  Note: Patient verbalizes the layout of the room; call light within reach; educated to call out when assistance needed   10/24/2022 0005 by Connie Mendosa RN  Outcome: Progressing     Problem: ABCDS Injury Assessment  Goal: Absence of physical injury  10/24/2022 1233 by Luz Sarmiento RN  Note: Hand hygiene performed, double identifiers used   10/24/2022 0005 by Connie Mendosa RN  Outcome: Progressing     Problem: Pain  Goal: Verbalizes/displays adequate comfort level or baseline comfort level  10/24/2022 1233 by Luz Sarmiento RN  Note: Denies pain, VS stable   10/24/2022 0005 by Connie Mendosa RN  Outcome: Progressing     Problem: Nutrition Deficit:  Goal: Optimize nutritional status  10/24/2022 1233 by Luz Sarmiento RN  Outcome: Progressing  Note: % of breakfast and lunch eaten  10/24/2022 0005 by Connie Mendosa RN  Outcome: Progressing

## 2022-10-25 VITALS
HEART RATE: 80 BPM | TEMPERATURE: 97.9 F | HEIGHT: 72 IN | OXYGEN SATURATION: 98 % | RESPIRATION RATE: 20 BRPM | DIASTOLIC BLOOD PRESSURE: 98 MMHG | WEIGHT: 201.06 LBS | SYSTOLIC BLOOD PRESSURE: 158 MMHG | BODY MASS INDEX: 27.23 KG/M2

## 2022-10-25 LAB
ABSOLUTE EOS #: 0.07 K/UL (ref 0–0.44)
ABSOLUTE IMMATURE GRANULOCYTE: <0.03 K/UL (ref 0–0.3)
ABSOLUTE LYMPH #: 1.51 K/UL (ref 1.1–3.7)
ABSOLUTE MONO #: 0.19 K/UL (ref 0.1–1.2)
ANION GAP SERPL CALCULATED.3IONS-SCNC: 9 MMOL/L (ref 9–17)
BASOPHILS # BLD: 1 % (ref 0–2)
BASOPHILS ABSOLUTE: <0.03 K/UL (ref 0–0.2)
BUN BLDV-MCNC: 12 MG/DL (ref 8–23)
CALCIUM SERPL-MCNC: 7.9 MG/DL (ref 8.6–10.4)
CHLORIDE BLD-SCNC: 103 MMOL/L (ref 98–107)
CO2: 25 MMOL/L (ref 20–31)
CREAT SERPL-MCNC: 0.66 MG/DL (ref 0.7–1.2)
EOSINOPHILS RELATIVE PERCENT: 2 % (ref 1–4)
GFR SERPL CREATININE-BSD FRML MDRD: >60 ML/MIN/1.73M2
GLUCOSE BLD-MCNC: 117 MG/DL (ref 70–99)
GLUCOSE BLD-MCNC: 41 MG/DL (ref 75–110)
GLUCOSE BLD-MCNC: 63 MG/DL (ref 75–110)
GLUCOSE BLD-MCNC: 85 MG/DL (ref 75–110)
HCT VFR BLD CALC: 34.2 % (ref 40.7–50.3)
HEMOGLOBIN: 11 G/DL (ref 13–17)
IMMATURE GRANULOCYTES: 0 %
LYMPHOCYTES # BLD: 48 % (ref 24–43)
MCH RBC QN AUTO: 30.6 PG (ref 25.2–33.5)
MCHC RBC AUTO-ENTMCNC: 32.2 G/DL (ref 28.4–34.8)
MCV RBC AUTO: 95 FL (ref 82.6–102.9)
MONOCYTES # BLD: 6 % (ref 3–12)
NRBC AUTOMATED: 0 PER 100 WBC
PDW BLD-RTO: 14.6 % (ref 11.8–14.4)
PLATELET # BLD: 120 K/UL (ref 138–453)
PMV BLD AUTO: 9 FL (ref 8.1–13.5)
POTASSIUM SERPL-SCNC: 3.8 MMOL/L (ref 3.7–5.3)
RBC # BLD: 3.6 M/UL (ref 4.21–5.77)
RBC # BLD: ABNORMAL 10*6/UL
SEG NEUTROPHILS: 43 % (ref 36–65)
SEGMENTED NEUTROPHILS ABSOLUTE COUNT: 1.35 K/UL (ref 1.5–8.1)
SODIUM BLD-SCNC: 137 MMOL/L (ref 135–144)
WBC # BLD: 3.2 K/UL (ref 3.5–11.3)

## 2022-10-25 PROCEDURE — 82947 ASSAY GLUCOSE BLOOD QUANT: CPT

## 2022-10-25 PROCEDURE — 6370000000 HC RX 637 (ALT 250 FOR IP): Performed by: STUDENT IN AN ORGANIZED HEALTH CARE EDUCATION/TRAINING PROGRAM

## 2022-10-25 PROCEDURE — 36415 COLL VENOUS BLD VENIPUNCTURE: CPT

## 2022-10-25 PROCEDURE — 6370000000 HC RX 637 (ALT 250 FOR IP): Performed by: NURSE PRACTITIONER

## 2022-10-25 PROCEDURE — 80048 BASIC METABOLIC PNL TOTAL CA: CPT

## 2022-10-25 PROCEDURE — 85025 COMPLETE CBC W/AUTO DIFF WBC: CPT

## 2022-10-25 PROCEDURE — 2580000003 HC RX 258: Performed by: STUDENT IN AN ORGANIZED HEALTH CARE EDUCATION/TRAINING PROGRAM

## 2022-10-25 RX ORDER — FLUDROCORTISONE ACETATE 0.1 MG/1
0.1 TABLET ORAL DAILY
Qty: 30 TABLET | Refills: 0 | Status: SHIPPED | OUTPATIENT
Start: 2022-10-26 | End: 2022-11-25

## 2022-10-25 RX ORDER — LOSARTAN POTASSIUM 50 MG/1
50 TABLET ORAL DAILY
Qty: 30 TABLET | Refills: 0 | Status: SHIPPED | OUTPATIENT
Start: 2022-10-26

## 2022-10-25 RX ORDER — INSULIN GLARGINE 100 [IU]/ML
15 INJECTION, SOLUTION SUBCUTANEOUS EVERY MORNING
Status: DISCONTINUED | OUTPATIENT
Start: 2022-10-25 | End: 2022-10-25 | Stop reason: HOSPADM

## 2022-10-25 RX ORDER — INSULIN LISPRO 100 [IU]/ML
0-16 INJECTION, SOLUTION INTRAVENOUS; SUBCUTANEOUS
Status: DISCONTINUED | OUTPATIENT
Start: 2022-10-25 | End: 2022-10-25 | Stop reason: HOSPADM

## 2022-10-25 RX ADMIN — LOSARTAN POTASSIUM 50 MG: 50 TABLET, FILM COATED ORAL at 08:39

## 2022-10-25 RX ADMIN — ATORVASTATIN CALCIUM 80 MG: 80 TABLET, FILM COATED ORAL at 08:39

## 2022-10-25 RX ADMIN — SODIUM CHLORIDE, PRESERVATIVE FREE 10 ML: 5 INJECTION INTRAVENOUS at 08:21

## 2022-10-25 RX ADMIN — INSULIN GLARGINE 15 UNITS: 100 INJECTION, SOLUTION SUBCUTANEOUS at 08:40

## 2022-10-25 RX ADMIN — AMLODIPINE BESYLATE 10 MG: 10 TABLET ORAL at 08:39

## 2022-10-25 NOTE — DISCHARGE SUMMARY
Temp:  97.9  HR 80  RR 20  /98  SPO2  98  Denies pain  Blood glucose 85    AVS reviewed with patient and spouse. Pt was told to follow up with neurosurgery, EP, PCP, and oncology. Pt plans on following up with own cardiologist and neurosurgeon. Patient was educated on neurosurgery follow up suggestions for C collar, driving and lifting instructions. Also, educated on stroke and heart attack symptoms. Neither  nor wife had any questions at that time. Wife was very tearful about discharge but  was adamant. The patient refused and snack or juice for his glucose. Wife said she had glucose tablets. I suggested he eat as soon as possible. The IVs were removed. The right IV did need additional pressure and bandaging. I suggested they wait to make sure it was good. They declined. Patient declined a wheelchair and escort to the exit.

## 2022-10-25 NOTE — PLAN OF CARE
Problem: Discharge Planning  Goal: Discharge to home or other facility with appropriate resources  10/24/2022 2355 by Jennifer Garcia RN  Outcome: Progressing     Problem: Skin/Tissue Integrity  Goal: Absence of new skin breakdown  Description: 1. Monitor for areas of redness and/or skin breakdown  2. Assess vascular access sites hourly  3. Every 4-6 hours minimum:  Change oxygen saturation probe site  4. Every 4-6 hours:  If on nasal continuous positive airway pressure, respiratory therapy assess nares and determine need for appliance change or resting period.   10/24/2022 2355 by Jennifer Garcia RN  Outcome: Progressing     Problem: Safety - Adult  Goal: Free from fall injury  10/24/2022 2355 by Jennifer Garcia RN  Outcome: Progressing     Problem: ABCDS Injury Assessment  Goal: Absence of physical injury  10/24/2022 2355 by Jennifer Garcia RN  Outcome: Progressing     Problem: Pain  Goal: Verbalizes/displays adequate comfort level or baseline comfort level  10/24/2022 2355 by Jennifer Garcia RN  Outcome: Progressing     Problem: Nutrition Deficit:  Goal: Optimize nutritional status  10/24/2022 2355 by Jennifer Garcia RN  Outcome: Progressing

## 2022-10-25 NOTE — CARE COORDINATION
Discharge 1 [facility] Case Management Department  Written by: Zoë Posadas RN    Patient Name: Gregorio Precise  Attending Provider: David Zheng,*  Admit Date: 10/21/2022  7:31 PM  MRN: [id number]  Account: [de-identified]                     : 1955  Discharge Date: 10/25/22      Disposition: home, no needs    Zoë Posadas RN

## 2022-10-25 NOTE — PROGRESS NOTES
PROGRESS NOTE    PATIENT NAME: Kelly Narayanan RECORD NO. [id number]  DATE: 10/25/2022    PRIMARY CARE PHYSICIAN: Gloria Campbell MD    HD: # 3    ASSESSMENT    Patient Active Problem List   Diagnosis    Closed fracture of spinous process of cervical vertebra, initial encounter ([facility] [address]    Fall   [place], bradycardic A-fib not on [facility] due to frequent falls, hx of CABG, whipple 2022  C5 SP fx  Nasal bone fx bilateral    MEDICAL DECISION MAKING AND PLAN  NEURO:   -C5 fx  NS: no surgical intervention, treat in aspen collar, f/u clinic in 2 wks, signed off, CT flex ex ordered outpatient  MRI C spine: previously noted C5 fx, prevertebral soft tissue swelling C2-C5  -Discontinued tylenol  -Judy q6h PRN     2.   CV:  -A-fib with slow VR   -Cards: ECHO pending   -hold coreg, increase norvasc, started Cozaar   -EP recommended fludrocortisone, would like to see again today before discharge   -no AC due to fall risk  -Home meds:restarted, coreg     3. GI  -Diet: regular diet  -Status post Whipple recently       4. HEME/ ID  -Tmax: Afebrile  -WBC: 3.5  -Chemo yesterday for Pancreatic adenocarcinoma   -Decadronx2 days following chemo as prescribed     5. ENDO              -B  -Insulin: home insulin lantus 18u morning     6. PPX:  -DVT: Lovenox     7. DISPO:               -PT/OT   -Follow-up final recommendations from electrophysiology        Chief Complaint: \"Want to leave\"    GABBY Cain 1 examined at bedside this morning, no acute events overnight. Overall doing well. Denies pain, nausea, emesis. Voiding and having bowel function. States that he is leaving today no matter what.     OBJECTIVE  VITALS: Temp: Temp: 97.9 °F (36.6 °C)Temp  Av.1 °F (36.7 °C)  Min: 97.8 °F (36.6 °C)  Max: 98.3 °F (30.9 °C) BP Systolic (57GCX), YQF:051 , Min:139 , CÉSAR:111   Diastolic (62SJK), ACR:49, Min:82, Max:102   Pulse Pulse  Av.1  Min: 60  Max: 87 Resp Resp  Avg: 15.1  Min: 7  Max: 23 Pulse ox SpO2  Av.3 %  Min: 98 %  Max: 100 %    CONSTITUTIONAL: appears well, no distress  HEENT: C-collar in place, PEARRL  LUNGS: clear to auscultation bilaterally  CV: RRR  GI: soft, non tender, no distention rigdity or guarding  MUSCULOSKELETAL: no extremity deformity  NEUROLOGIC: GCS 15, moving all four extremities, no sensory deficits or weakness  SKIN: warm and dry    Drain/tube output:    N/a    LAB:  CBC:   Recent Labs     10/23/22  0434 10/24/22  0556 10/25/22  0729   WBC 3.1* 3.5 3.2*   HGB 11.2* 11.2* 11.0*   HCT 33.5* 32.1* 34.2*   MCV 89.8 87.7 95.0   * 126* 120*       BMP:   Recent Labs     10/23/22  0434 10/24/22  0556 10/25/22  0729    135 137   K 3.9 3.3* 3.8    100 103   CO2 28 28 25   BUN 7* 8 12   CREATININE 0.54* 0.53* 0.66*   GLUCOSE 190* 158* 117*         RADIOLOGY:  MRI CERVICAL SPINE WO CONTRAST   Final Result   Previously described C5 posterior element fracture is not as well delineated   on the current exam.      Prevertebral soft tissue swelling/fluid from C2-C5. Multilevel degenerative change with bilateral foraminal narrowing as   described above. CTA NECK W CONTRAST   Final Result   No evidence of dissection or acute injury in bilateral common carotid   arteries, bilateral internal carotid arteries and bilateral vertebral   arteries in the neck. In the soft tissues of the neck there is no acute process. Fracture involving the spinous process of C5 vertebra, redemonstrated. CT THORACIC SPINE TRAUMA RECONSTRUCTION   Final Result   1. No acute lumbar spine or thoracic spine fracture. 2. Multilevel mild degenerative changes are seen. CT LUMBAR SPINE TRAUMA RECONSTRUCTION   Final Result   1. No acute lumbar spine or thoracic spine fracture. 2. Multilevel mild degenerative changes are seen. CT CHEST ABDOMEN PELVIS W CONTRAST Additional Contrast? None   Final Result   1.  No thoracic or abdominal aortic aneurysm or dissection. 2. Atherosclerosis with coronary artery involvement and postoperative changes   from prior CABG. 3. Postoperative changes seen related to reported history of pancreatic   neoplasm, with a 19 mm left adrenal nodule which could represent adrenal   metastasis. Correlate with any previous outside CT imaging as no comparisons   are available within PACS for my review. 4. There is a small fluid collection seen adjacent to the pancreatic   resection site and hepaticojejunostomy, which could represent a small   postoperative seroma or lymphocele. No solid-appearing metastasis noted   elsewhere. 5. No acute process seen within the abdomen or pelvis. 6. Trace amount of ascites seen in the right lower quadrant of uncertain   significance. CT HEAD WO CONTRAST   Final Result   Head CT: No acute intracranial abnormality detected. Mildly impacted and angulated nasal bone fractures bilaterally. Cervical spine CT: Potentially unstable fracture involving the posterior   elements of C5. Findings were discussed with Santos Claude EDWARDS at 11:12 pm on 10/21/2022. CT CERVICAL SPINE WO CONTRAST   Final Result   Head CT: No acute intracranial abnormality detected. Mildly impacted and angulated nasal bone fractures bilaterally. Cervical spine CT: Potentially unstable fracture involving the posterior   elements of C5. Findings were discussed with Santos Claude EDWARDS at 11:12 pm on 10/21/2022. XR CHEST PORTABLE   Final Result   Cardial pericardial silhouette enlargement. Differential considerations   include cardiomyopathy and pericardial effusion. No acute infiltrate identified.          XR CERVICAL SPINE FLEXION AND EXTENSION    (Results Pending)         Robi Kulkarni DO  10/23/22, 1:34 PM

## 2022-10-25 NOTE — PLAN OF CARE
Problem: Discharge Planning  Goal: Discharge to home or other facility with appropriate resources  10/25/2022 1228 by Rebeka Candelario RN  Outcome: Completed  10/25/2022 1128 by Rebeka Candelario RN  Outcome: Adequate for Discharge  10/24/2022 2355 by Giovanni Monsalve RN  Outcome: Progressing     Problem: Skin/Tissue Integrity  Goal: Absence of new skin breakdown  Description: 1. Monitor for areas of redness and/or skin breakdown  2. Assess vascular access sites hourly  3. Every 4-6 hours minimum:  Change oxygen saturation probe site  4. Every 4-6 hours:  If on nasal continuous positive airway pressure, respiratory therapy assess nares and determine need for appliance change or resting period.   10/25/2022 1228 by Rebeka Candelario RN  Outcome: Completed  10/25/2022 1128 by Rebeka Candelario RN  Outcome: Adequate for Discharge  10/24/2022 2355 by Giovanni Monsalve RN  Outcome: Progressing     Problem: Safety - Adult  Goal: Free from fall injury  10/25/2022 1228 by Rebeka Candelario RN  Outcome: Completed  10/25/2022 1128 by Rebeka Candelario RN  Outcome: Adequate for Discharge  10/24/2022 2355 by Giovanni Monsalve RN  Outcome: Progressing     Problem: ABCDS Injury Assessment  Goal: Absence of physical injury  10/25/2022 1228 by Rebeka Candelario RN  Outcome: Completed  10/25/2022 1128 by Rebeka Candelario RN  Outcome: Adequate for Discharge  10/24/2022 2355 by Giovanin Monsalve RN  Outcome: Progressing     Problem: Pain  Goal: Verbalizes/displays adequate comfort level or baseline comfort level  10/25/2022 1228 by Rebeka Candelario RN  Outcome: Completed  10/25/2022 1128 by Rebeka Candelario RN  Outcome: Adequate for Discharge  10/24/2022 2355 by Giovanni Monsalve RN  Outcome: Progressing     Problem: Nutrition Deficit:  Goal: Optimize nutritional status  10/25/2022 1228 by Rebeka Candelario RN  Outcome: Completed  10/25/2022 1128 by Rebeka Candelario RN  Outcome: Adequate for Discharge  10/24/2022 2355 by Giovanni Monsalve RN  Outcome: Progressing

## 2022-10-25 NOTE — CARE COORDINATION
Trauma Care Coordination:     Met with the patient at the bedside. Discussed discharge plan, pt would like to leave today. Patient care tech at the bedside. BS 41, getting the patient orange juice. Discussed FU with Cardiology/Electrophysiology recs. Discussed NS FU, patient wants to FU in [place]. Called radiology hub for a disc. Updated RN will need to be picked up. Wife at the beside, tearful. No further questions or concerns. Pt requesting to leave AMA. Updated Trauma team. Patient agreeable to outpatient PT/OT. Outpatient PT/OT ordered.

## 2022-10-25 NOTE — PROGRESS NOTES
[facility])  Occupational Therapy Not Seen Note    DATE: 10/25/2022    NAME: Maria L Davies  MRN: [id number]   : 1955      Patient not seen this date for Occupational Therapy due to:    Patient Declined: Patient declined stating no acute needs for therapy; When OT asked is patient could recall cervical precautions, patient declined to answer and said wife would for him. Wife educated d/t not being aware of all precautions and additional education on don/doffing collar and positional restrictions to promote safe recovery; Pt declined therapy and orders completed. Wife pleasantly verbalized understanding. Please re-consult if patient becomes willing to participate in skilled therapy.      Electronically signed by Jone Briceno OT on 10/25/2022 at 11:05 AM

## 2022-10-25 NOTE — PROGRESS NOTES
Notified EP that he was planning dc today and follow up in clinic. He should continue florinef and closely follow up. He declines to stay for face to face with EP.

## 2022-10-26 NOTE — DISCHARGE SUMMARY
DISCHARGE SUMMARY:    PATIENT NAME:  Ana Abernathy  YOB: 1955  MEDICAL RECORD NO. [id number]  DATE: 10/26/22  PRIMARY CARE PHYSICIAN: Alisha Malik MD  ADMIT DATE:  10/21/2022    DISCHARGE DATE:  10/25/2022  DISPOSITION:  home  ADMITTING DIAGNOSIS:   Fall from standing    DIAGNOSIS:   Patient Active Problem List   Diagnosis    Closed fracture of spinous process of cervical vertebra, initial encounter [facility])    Fall       CONSULTANTS:  cardiology, neurosurgery    PROCEDURES:   none    HOSPITAL COURSE:   Ana Abernathy is a 78 y.o. male who was admitted on 10/21/2022  Hospital Course: The patient is a 78 y.o. gentleman with h/o HTN, HLP, CAD, CABG 2005 and pancreatic cancer with recent Whipple procedure, admitted 10/21 with C5 spine fracture and bilateral nasal bone fracture following a syncopal episode. He was visiting [place] from his home in [facility]. Patient initially presented with fall from standing height, stated that he was at a casino table when he felt dizzy and lightheaded and fell forward, he states that he fell on the front of his face but denies any loss of consciousness. He was initially bradycardic with HR 45-50 bpm, with sinus rates gradually increasing following admission. Patient reportedly does have a history of PAF however he has not been on AC likely secondary to multiple falls however no records available in EMR. Reportedly when he was brought to the ER he was also found to be hypoglycemic with blood glucose in the 40s. Patient does have a history of coronary artery bypass surgery in 2005 but no records available.   CAT scan of brain shows no evidence of acute intracranial process  CAT scan cervical spine suggested a C5 spinous process fracture without any listhesis, or fractures of any stabilizing structures such as facets, lamina, vertebral bodies, etc.  MRI of the cervical spine reveals some edema within the posterior ligaments of the spine but without disruption of the ligamentum flavum, anterior or posterior long-term ligament, facet capsule, etc. to suggest instability     IMPRESSION  Stable C5 spinous process fracture     RECOMMENDATIONS  No surgical intervention needed  Continue to stay in a cervical collar until follow-up in 2 weeks at which time flexion-extension C-spine x-rays can be performed to ensure stability  Labs and imaging were followed daily. It was recommended that he take florinef and fu as OP. He declined to stay for EP face to face and declined to take florinef. Angry at discharge. On day of discharge Ezequiel Coon  was tolerating a regular diet  had adequate analgeia on oral medications  had no signs of complication. He was deemed medically stable for discharged to Home        PHYSICAL EXAMINATION:        Discharge Vitals:  height is 6' (1.829 m) and weight is 201 lb 1 oz (91.2 kg). His oral temperature is 97.9 °F (36.6 °C). His blood pressure is 158/98 (abnormal) and his pulse is 80. His respiration is 20 and oxygen saturation is 98%. Exam on day of discharge:  Syncopal fall at [facility], bradycardic A-fib not on AC due to frequent falls, hx of CABG, whipple 8/2022  C5 SP fx  Nasal bone fx bilateral     MEDICAL DECISION MAKING AND PLAN  NEURO:   -C5 fx  NS: no surgical intervention, treat in aspen collar, f/u clinic in 2 wks, signed off, CT flex ex ordered outpatient  MRI C spine: previously noted C5 fx, prevertebral soft tissue swelling C2-C5  -Discontinued tylenol  -Judy q6h PRN     2.   CV:  -A-fib with slow VR              -Cards: ECHO pending              -hold coreg, increase norvasc, started Cozaar              -EP recommended fludrocortisone, would like to see again today before discharge              -no AC due to fall risk  -Home meds:restarted, coreg     3. GI  -Diet: regular diet  -Status post Whipple recently        4.   HEME/ ID  -Tmax: Afebrile  -WBC: 3.5  -Chemo yesterday for Pancreatic adenocarcinoma -Decadronx2 days following chemo as prescribed     5. ENDO              -B  -Insulin: home insulin lantus 18u morning     6. PPX:  -DVT: Lovenox     7. DISPO:               -PT/OT              -Follow-up final recommendations from electrophysiology           Chief Complaint: \"Want to leave\"      Glynn Dickerson examined at bedside this morning, no acute events overnight. Overall doing well. Denies pain, nausea, emesis. Voiding and having bowel function. States that he is leaving today no matter what.      OBJECTIVE  VITALS: Temp: Temp: 97.9 °F (36.6 °C)Temp  Av.1 °F (36.7 °C)  Min: 97.8 °F (36.6 °C)  Max: 98.3 °F (93.9 °C) BP Systolic (01TJR), FKZ:279 , Min:139 , XYO:168   Diastolic (62MZM), UZK:06, Min:82, Max:102   Pulse Pulse  Av.1  Min: 60  Max: 87 Resp Resp  Avg: 15.1  Min: 7  Max: 23 Pulse ox SpO2  Av.3 %  Min: 98 %  Max: 100 %     CONSTITUTIONAL: appears well, no distress  HEENT: C-collar in place, PEARRL  LUNGS: clear to auscultation bilaterally  CV: RRR  GI: soft, non tender, no distention rigdity or guarding  MUSCULOSKELETAL: no extremity deformity  NEUROLOGIC: GCS 15, moving all four extremities, no sensory deficits or weakness  SKIN: warm and dry    LABS:     Recent Labs     10/24/22  0556 10/25/22  0729   WBC 3.5 3.2*   HGB 11.2* 11.0*   HCT 32.1* 34.2*   * 120*    137   K 3.3* 3.8    103   CO2 28 25   BUN 8 12   CREATININE 0.53* 0.66*       DIAGNOSTIC TESTS:    CT HEAD WO CONTRAST    Result Date: 10/21/2022  EXAMINATION: CT OF THE HEAD WITHOUT CONTRAST; CT OF THE CERVICAL SPINE WITHOUT CONTRAST 10/21/2022 7:21 pm TECHNIQUE: CT of the head was performed without the administration of intravenous contrast. Automated exposure control, iterative reconstruction, and/or weight based adjustment of the mA/kV was utilized to reduce the radiation dose to as low as reasonably achievable.; CT of the cervical spine was performed without the administration of intravenous contrast. Multiplanar reformatted images are provided for review. Automated exposure control, iterative reconstruction, and/or weight based adjustment of the mA/kV was utilized to reduce the radiation dose to as low as reasonably achievable. COMPARISON: None. HISTORY: ORDERING SYSTEM PROVIDED HISTORY: Eval syncope fall TECHNOLOGIST PROVIDED HISTORY: Eval syncope fall Decision Support Exception - unselect if not a suspected or confirmed emergency medical condition->Emergency Medical Condition (MA); ORDERING SYSTEM PROVIDED HISTORY: Eval syncope fall TECHNOLOGIST PROVIDED HISTORY: Eval syncope fall Decision Support Exception - unselect if not a suspected or confirmed emergency medical condition->Emergency Medical Condition (MA) FINDINGS: HEAD CT: BRAIN/VENTRICLES:  No acute loss of the gray-white matter differentiation is identified to suggest acute or subacute infarct. No masses or hemorrhages within the brain parenchyma are found. No evidence of midline shift. There is mild periventricular low-attenuation, compatible with chronic small vessel ischemic disease. The intracranial vasculature, including the dural venous sinuses, is within normal limits. ORBITS: No acute orbital abnormalities are identified. SINUSES: Air-fluid level the left maxillary sinus noted, likely reflecting blood products (nasal bone fractures are noted, with a laceration of the nasal bridge). SOFT TISSUES/SKULL: The calvarium is intact. Again, nasal bone fractures are seen bilaterally, with angulation and mild impaction. CERVICAL SPINE CT: BONES/ALIGNMENT: There is a fracture that it extends along the long axis of the spinous process of C5, extending into the pars, slightly to the left, and therefore disrupting the ring. This is best visualized on true axial image 58 through 60. Definite additional fracture at that segment is not seen. No fractures are seen elsewhere within the cervical spine.  DEGENERATIVE CHANGES: Multilevel degenerative disc and facet disease noted. No high-grade central canal stenosis is found. SOFT TISSUES: There is no prevertebral soft tissue swelling. Head CT: No acute intracranial abnormality detected. Mildly impacted and angulated nasal bone fractures bilaterally. Cervical spine CT: Potentially unstable fracture involving the posterior elements of C5. Findings were discussed with Jeremiah KABA at 11:12 pm on 10/21/2022. CT CERVICAL SPINE WO CONTRAST    Result Date: 10/21/2022  EXAMINATION: CT OF THE HEAD WITHOUT CONTRAST; CT OF THE CERVICAL SPINE WITHOUT CONTRAST 10/21/2022 7:21 pm TECHNIQUE: CT of the head was performed without the administration of intravenous contrast. Automated exposure control, iterative reconstruction, and/or weight based adjustment of the mA/kV was utilized to reduce the radiation dose to as low as reasonably achievable.; CT of the cervical spine was performed without the administration of intravenous contrast. Multiplanar reformatted images are provided for review. Automated exposure control, iterative reconstruction, and/or weight based adjustment of the mA/kV was utilized to reduce the radiation dose to as low as reasonably achievable. COMPARISON: None. HISTORY: ORDERING SYSTEM PROVIDED HISTORY: Eval syncope fall TECHNOLOGIST PROVIDED HISTORY: Eval syncope fall Decision Support Exception - unselect if not a suspected or confirmed emergency medical condition->Emergency Medical Condition (MA); ORDERING SYSTEM PROVIDED HISTORY: Eval syncope fall TECHNOLOGIST PROVIDED HISTORY: Eval syncope fall Decision Support Exception - unselect if not a suspected or confirmed emergency medical condition->Emergency Medical Condition (MA) FINDINGS: HEAD CT: BRAIN/VENTRICLES:  No acute loss of the gray-white matter differentiation is identified to suggest acute or subacute infarct. No masses or hemorrhages within the brain parenchyma are found. No evidence of midline shift.  There is mild periventricular low-attenuation, compatible with chronic small vessel ischemic disease. The intracranial vasculature, including the dural venous sinuses, is within normal limits. ORBITS: No acute orbital abnormalities are identified. SINUSES: Air-fluid level the left maxillary sinus noted, likely reflecting blood products (nasal bone fractures are noted, with a laceration of the nasal bridge). SOFT TISSUES/SKULL: The calvarium is intact. Again, nasal bone fractures are seen bilaterally, with angulation and mild impaction. CERVICAL SPINE CT: BONES/ALIGNMENT: There is a fracture that it extends along the long axis of the spinous process of C5, extending into the pars, slightly to the left, and therefore disrupting the ring. This is best visualized on true axial image 58 through 60. Definite additional fracture at that segment is not seen. No fractures are seen elsewhere within the cervical spine. DEGENERATIVE CHANGES: Multilevel degenerative disc and facet disease noted. No high-grade central canal stenosis is found. SOFT TISSUES: There is no prevertebral soft tissue swelling. Head CT: No acute intracranial abnormality detected. Mildly impacted and angulated nasal bone fractures bilaterally. Cervical spine CT: Potentially unstable fracture involving the posterior elements of C5. Findings were discussed with Jed KABA at 11:12 pm on 10/21/2022. MRI CERVICAL SPINE WO CONTRAST    Result Date: 10/22/2022  EXAMINATION: MRI OF THE CERVICAL SPINE WITHOUT CONTRAST 10/22/2022 2:18 pm TECHNIQUE: Multiplanar multisequence MRI of the cervical spine was performed without the administration of intravenous contrast. COMPARISON: None. HISTORY: ORDERING SYSTEM PROVIDED HISTORY: Unstable C5 fracture TECHNOLOGIST PROVIDED HISTORY: Unstable C5 fracture Reason for Exam: Unstable C5 fracture FINDINGS: BONES/ALIGNMENT: The vertebral body heights are maintained.   There is age-appropriate bone marrow signal.  The previously described posterior element fracture at C5 is not as well delineated. There is multilevel degenerative disc disease with loss of disc signal.  There is mild disc space narrowing at the C5-6 and C6-7 levels. There is minimal anterolisthesis of C4 on C5. SPINAL CORD: No abnormal cord signal is seen. SOFT TISSUES: The posterior paraspinal soft tissues are unremarkable. There is prevertebral soft tissue swelling from C2-C5. C2-C3: There is no significant disc protrusion, spinal canal stenosis or neural foraminal narrowing. C3-C4: There is no significant disc protrusion, spinal canal stenosis or neural foraminal narrowing. C4-C5: There is no significant disc protrusion, spinal canal stenosis or neural foraminal narrowing. C5-C6: There is a disc osteophyte complex with uncovertebral and facet hypertrophy. There is no canal stenosis. There is moderate right and severe left foraminal narrowing. C6-C7: There is a disc osteophyte complex with uncovertebral and facet hypertrophy. There is no canal stenosis. There is mild left and severe right foraminal narrowing. C7-T1: There is no significant disc protrusion, spinal canal stenosis or neural foraminal narrowing. Previously described C5 posterior element fracture is not as well delineated on the current exam. Prevertebral soft tissue swelling/fluid from C2-C5. Multilevel degenerative change with bilateral foraminal narrowing as described above. XR CHEST PORTABLE    Result Date: 10/21/2022  EXAMINATION: ONE XRAY VIEW OF THE CHEST 10/21/2022 5:11 pm COMPARISON: None. HISTORY: ORDERING SYSTEM PROVIDED HISTORY: eval aspiration TECHNOLOGIST PROVIDED HISTORY: eval aspiration Reason for Exam: upr,passed out FINDINGS: Cardial pericardial silhouette is enlarged. No previous examinations are available for comparison. Joseph catheter noted. Midline sternotomy wires are noted. Lungs are clear. No pneumothorax. No free air. No acute bony abnormality.      Cardial pericardial silhouette enlargement. Differential considerations include cardiomyopathy and pericardial effusion. No acute infiltrate identified. CTA NECK W CONTRAST    Result Date: 10/22/2022  EXAMINATION: CTA OF THE NECK 10/22/2022 1:21 am TECHNIQUE: CTA of the neck was performed with the administration of intravenous contrast. Multiplanar reformatted images are provided for review. MIP images are provided for review. Stenosis of the internal carotid arteries measured using NASCET criteria. Automated exposure control, iterative reconstruction, and/or weight based adjustment of the mA/kV was utilized to reduce the radiation dose to as low as reasonably achievable. COMPARISON: CT scan of cervical spine on 10/21/2022 at 2229 hours. HISTORY: ORDERING SYSTEM PROVIDED HISTORY: C5 fx TECHNOLOGIST PROVIDED HISTORY: C5 fx Decision Support Exception - unselect if not a suspected or confirmed emergency medical condition->Emergency Medical Condition (MA) Reason for Exam: C5 fx FINDINGS: AORTIC ARCH/ARCH VESSELS: No dissection or arterial injury. No significant stenosis of the brachiocephalic or subclavian arteries. CAROTID ARTERIES: No dissection, arterial injury, or hemodynamically significant stenosis by NASCET criteria. At the right common carotid bifurcations and the origin of the right internal carotid artery there are mild calcified plaques with subtle stenosis, less than 20% stenosis. At the left common carotid bifurcation and origin of left internal carotid artery there are mild calcified plaques with minimal stenosis, which is likely to be less than 20% stenosis. VERTEBRAL ARTERIES: No dissection, arterial injury, or significant stenosis. The left vertebral artery arises from the arch of aorta. SOFT TISSUES: At the apices of both lungs there is no acute process. Evidence of mild atelectatic changes, and/or fibrotic changes in the retrohilar  portions of both lungs.  No cervical or superior mediastinal lymphadenopathy. The larynx and pharynx are unremarkable. No acute abnormality of the salivary and thyroid glands. BONES: Findings indicating fracture involving the spinous process of C5 vertebra. No evidence of dissection or acute injury in bilateral common carotid arteries, bilateral internal carotid arteries and bilateral vertebral arteries in the neck. In the soft tissues of the neck there is no acute process. Fracture involving the spinous process of C5 vertebra, redemonstrated. CT CHEST ABDOMEN PELVIS W CONTRAST Additional Contrast? None    Result Date: 10/23/2022  EXAMINATION: CT OF THE CHEST, ABDOMEN, AND PELVIS WITH CONTRAST 10/21/2022 10:21 pm TECHNIQUE: CT of the chest, abdomen and pelvis was performed with the administration of intravenous contrast. Multiplanar reformatted images are provided for review. Automated exposure control, iterative reconstruction, and/or weight based adjustment of the mA/kV was utilized to reduce the radiation dose to as low as reasonably achievable. COMPARISON: None HISTORY: ORDERING SYSTEM PROVIDED HISTORY: Eval syncope bradycardia TECHNOLOGIST PROVIDED HISTORY: Eval syncope bradycardia Decision Support Exception - unselect if not a suspected or confirmed emergency medical condition->Emergency Medical Condition (MA) FINDINGS: Chest: Mediastinum: No thoracic aortic aneurysm is identified. No aortic dissection. Separate origin of the left vertebral artery off the aortic arch. Atherosclerosis is identified. Coronary artery involvement as well as postoperative changes from prior CABG. Cardiomegaly. No significant pericardial effusion. No mediastinal lymphadenopathy. No significant focal esophageal thickening is identified. Thyroid gland appears overall unremarkable. Lungs/pleura: No pleural effusion. No pneumothorax. Azygous fissure noted. Patchy nodular and ground-glass airspace disease seen dependently in the lower lobes.   No spiculated lung mass is identified. Soft Tissues/Bones: No axillary or supraclavicular lymphadenopathy is identified. No acute osseous abnormality is seen. No osseous destructive process is identified. No acute vertebral body compression deformity is identified. Degenerative changes seen in the cervical and thoracic spine. There is a left-sided chest port from a subclavian venous approach with the catheter tip terminating in the proximal superior vena cava. Abdomen/Pelvis: Organs: No enhancing or hypodense mass seen in the liver or spleen. There is a 19 mm nodule measuring 61 Hounsfield units in the left adrenal gland. No right-sided adrenal mass. There is a metallic structure identified within the pancreatic duct, with findings suggestive of a prior Whipple procedure. There is a fluid structure identified adjacent to the pancreas and hepaticojejunostomy which could represent a postoperative seroma or lymphocele, noted on and around axial image 50. No suspicious mass. GI/Bowel: No acute ileus or obstruction is identified. No focal inflammatory bowel wall thickening. No significant stool volume identified in the colon. Pelvis: No bladder wall thickening is identified. No pelvic mass. There is a trace amount of free fluid noted within the right lower quadrant. Peritoneum/Retroperitoneum: No abdominal aortic aneurysm. No dissection. No bulky retroperitoneal or mesenteric lymphadenopathy. Small lymph nodes are noted. Bones/Soft Tissues: No acute subcutaneous soft tissue abnormality. Postoperative changes from prior total hip arthroplasty bilaterally. Degenerative changes seen in the sacroiliac joints. No osseous destructive process seen within the spine. 1. No thoracic or abdominal aortic aneurysm or dissection. 2. Atherosclerosis with coronary artery involvement and postoperative changes from prior CABG.  3. Postoperative changes seen related to reported history of pancreatic neoplasm, with a 19 mm left adrenal nodule which could represent adrenal metastasis. Correlate with any previous outside CT imaging as no comparisons are available within PACS for my review. 4. There is a small fluid collection seen adjacent to the pancreatic resection site and hepaticojejunostomy, which could represent a small postoperative seroma or lymphocele. No solid-appearing metastasis noted elsewhere. 5. No acute process seen within the abdomen or pelvis. 6. Trace amount of ascites seen in the right lower quadrant of uncertain significance. CT LUMBAR SPINE TRAUMA RECONSTRUCTION    Result Date: 10/22/2022  EXAMINATION: CT OF THE THORACIC SPINE WITHOUT CONTRAST; CT OF THE LUMBAR SPINE WITHOUT CONTRAST  10/21/2022 11:51 pm: TECHNIQUE: CT of the thoracic spine was performed without the administration of intravenous contrast. Multiplanar reformatted images are provided for review. Automated exposure control, iterative reconstruction, and/or weight based adjustment of the mA/kV was utilized to reduce the radiation dose to as low as reasonably achievable.; CT of the lumbar spine was performed without the administration of intravenous contrast. Multiplanar reformatted images are provided for review. Adjustment of mA and/or kV according to patient size was utilized. Automated exposure control, iterative reconstruction, and/or weight based adjustment of the mA/kV was utilized to reduce the radiation dose to as low as reasonably achievable. COMPARISON: CT chest, abdomen and pelvis performed the same day HISTORY: ORDERING SYSTEM PROVIDED HISTORY: eval fall TECHNOLOGIST PROVIDED HISTORY: eval fall Reason for Exam: eval fall FINDINGS: Thoracic spine: Thoracic vertebral bodies appear normal in height and alignment. Transverse processes appear intact. No acute compression deformity or burst fracture is identified. Mild degenerative disc disease is seen, with disc space narrowing as well as small anterior osteophytes, greatest in the lower thoracic spine.  Lumbar spine: No acute lumbar spine fracture is identified. Transverse processes and spinous processes appear intact. Sacroiliac joints show degenerative changes. No focal significant canal or foraminal stenosis is identified. Minimal superior endplate Schmorl's node noted at the level of L1. No osseous destructive process. Greatest degree of degenerative disc disease at the lumbosacral junction. 1. No acute lumbar spine or thoracic spine fracture. 2. Multilevel mild degenerative changes are seen. CT THORACIC SPINE TRAUMA RECONSTRUCTION    Result Date: 10/22/2022  EXAMINATION: CT OF THE THORACIC SPINE WITHOUT CONTRAST; CT OF THE LUMBAR SPINE WITHOUT CONTRAST  10/21/2022 11:51 pm: TECHNIQUE: CT of the thoracic spine was performed without the administration of intravenous contrast. Multiplanar reformatted images are provided for review. Automated exposure control, iterative reconstruction, and/or weight based adjustment of the mA/kV was utilized to reduce the radiation dose to as low as reasonably achievable.; CT of the lumbar spine was performed without the administration of intravenous contrast. Multiplanar reformatted images are provided for review. Adjustment of mA and/or kV according to patient size was utilized. Automated exposure control, iterative reconstruction, and/or weight based adjustment of the mA/kV was utilized to reduce the radiation dose to as low as reasonably achievable. COMPARISON: CT chest, abdomen and pelvis performed the same day HISTORY: ORDERING SYSTEM PROVIDED HISTORY: eval fall TECHNOLOGIST PROVIDED HISTORY: eval fall Reason for Exam: eval fall FINDINGS: Thoracic spine: Thoracic vertebral bodies appear normal in height and alignment. Transverse processes appear intact. No acute compression deformity or burst fracture is identified. Mild degenerative disc disease is seen, with disc space narrowing as well as small anterior osteophytes, greatest in the lower thoracic spine.  Lumbar spine: No acute lumbar spine fracture is identified. Transverse processes and spinous processes appear intact. Sacroiliac joints show degenerative changes. No focal significant canal or foraminal stenosis is identified. Minimal superior endplate Schmorl's node noted at the level of L1. No osseous destructive process. Greatest degree of degenerative disc disease at the lumbosacral junction. 1. No acute lumbar spine or thoracic spine fracture. 2. Multilevel mild degenerative changes are seen. DISCHARGE INSTRUCTIONS     Discharge Medications:        Medication List        START taking these medications      fludrocortisone 0.1 MG tablet  Commonly known as: FLORINEF  Take 1 tablet by mouth daily     losartan 50 MG tablet  Commonly known as: COZAAR  Take 1 tablet by mouth daily            CONTINUE taking these medications      amLODIPine 2.5 MG tablet  Commonly known as: NORVASC     atorvastatin 80 MG tablet  Commonly known as: LIPITOR     candesartan 32 MG tablet  Commonly known as: ATACAND     carvedilol 25 MG tablet  Commonly known as: COREG     dexamethasone 2 MG tablet  Commonly known as: DECADRON     Farxiga 5 MG tablet  Generic drug: dapagliflozin     Lantus SoloStar 100 UNIT/ML injection pen  Generic drug: insulin glargine     metFORMIN 1000 MG tablet  Commonly known as: GLUCOPHAGE     POTASSIUM CHLORIDE PO               Where to Get Your Medications        These medications were sent to [address] [address] [facility]. [id number]      Hours: 24-hours Phone: [phone]   fludrocortisone 0.1 MG tablet  losartan 50 MG tablet       Diet: No diet orders on file diet as tolerated  Activity: As instructed WEIGHT BEARING STATUS: Weight bearing as tolerated  Wound Care: Daily and as needed.     DISPOSITION: Home    Follow-up:  DEDRICK Fuentes - CNP  [address] [place] [address], [address] [facility] [id number]  [phone]    Follow up in 2 week(s)  Follow up with Neurosurgery    Luna Sands MD  [address]  [phone]    Follow up  Postoperative changes seen related to reported history of pancreatic   neoplasm, with a 19 mm left adrenal nodule which could represent adrenal   metastasis. Correlate with any previous outside CT imaging as no comparisons   are available within PACS for my review. Edmar Bell MD  [address] [place] [address]    Schedule an appointment as soon as possible for a visit  Follow up with Cardiology        SIGNED:  DEDRICK Hemphill CNP   10/26/2022, 11:36 AM  Time Spent for discharge: 35 minutes
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
